# Patient Record
Sex: FEMALE | Race: WHITE | NOT HISPANIC OR LATINO | URBAN - METROPOLITAN AREA
[De-identification: names, ages, dates, MRNs, and addresses within clinical notes are randomized per-mention and may not be internally consistent; named-entity substitution may affect disease eponyms.]

---

## 2019-01-13 ENCOUNTER — EMERGENCY (EMERGENCY)
Facility: HOSPITAL | Age: 79
LOS: 0 days | Discharge: HOME | End: 2019-01-13
Attending: EMERGENCY MEDICINE | Admitting: EMERGENCY MEDICINE

## 2019-01-13 VITALS
SYSTOLIC BLOOD PRESSURE: 160 MMHG | OXYGEN SATURATION: 97 % | TEMPERATURE: 98 F | RESPIRATION RATE: 18 BRPM | DIASTOLIC BLOOD PRESSURE: 90 MMHG | HEART RATE: 92 BPM

## 2019-01-13 VITALS
RESPIRATION RATE: 19 BRPM | DIASTOLIC BLOOD PRESSURE: 83 MMHG | SYSTOLIC BLOOD PRESSURE: 195 MMHG | OXYGEN SATURATION: 98 % | HEART RATE: 91 BPM | TEMPERATURE: 98 F

## 2019-01-13 DIAGNOSIS — R05 COUGH: ICD-10-CM

## 2019-01-13 DIAGNOSIS — J40 BRONCHITIS, NOT SPECIFIED AS ACUTE OR CHRONIC: ICD-10-CM

## 2019-01-13 RX ORDER — DEXAMETHASONE 0.5 MG/5ML
10 ELIXIR ORAL ONCE
Qty: 0 | Refills: 0 | Status: COMPLETED | OUTPATIENT
Start: 2019-01-13 | End: 2019-01-13

## 2019-01-13 RX ORDER — AZITHROMYCIN 500 MG/1
1 TABLET, FILM COATED ORAL
Qty: 1 | Refills: 0
Start: 2019-01-13 | End: 2019-01-17

## 2019-01-13 RX ORDER — IPRATROPIUM/ALBUTEROL SULFATE 18-103MCG
3 AEROSOL WITH ADAPTER (GRAM) INHALATION ONCE
Qty: 0 | Refills: 0 | Status: COMPLETED | OUTPATIENT
Start: 2019-01-13 | End: 2019-01-13

## 2019-01-13 RX ORDER — ALBUTEROL 90 UG/1
2 AEROSOL, METERED ORAL
Qty: 1 | Refills: 0
Start: 2019-01-13 | End: 2019-02-11

## 2019-01-13 RX ORDER — ALBUTEROL 90 UG/1
2.5 AEROSOL, METERED ORAL ONCE
Qty: 0 | Refills: 0 | Status: COMPLETED | OUTPATIENT
Start: 2019-01-13 | End: 2019-01-13

## 2019-01-13 RX ADMIN — Medication 3 MILLILITER(S): at 13:57

## 2019-01-13 RX ADMIN — ALBUTEROL 2.5 MILLIGRAM(S): 90 AEROSOL, METERED ORAL at 15:20

## 2019-01-13 RX ADMIN — Medication 10 MILLIGRAM(S): at 15:23

## 2019-01-13 NOTE — ED ADULT NURSE NOTE - LANGUAGE ASSISTANCE NEEDED
No-Patient/Caregiver offered and refused free interpretation services./as per son patient is requesting for him to translate

## 2019-01-13 NOTE — ED PROVIDER NOTE - PHYSICAL EXAMINATION
Physical Exam    Vital Signs: I have reviewed the initial vital signs  Constitutional: well-nourished, appears stated age, uncomfortable  HEENT: Conjunctiva pink, Sclera clear, PERRLA, EOMI. Mucous membranes moist, no exudates or lesions noted, uvula midline.  Cardiovascular: S1 and S2 present, regular rate, regular rhythm. radial pulses equal and 2+ No peripheral edema  Respiratory: increased respiratory effort, diffuse wheezing and rhonchi  Integumentary: warm, dry, no rash  Neurologic: A & O x 3, CN II-XII grossly intact, all extremities’ motor and sensory functions grossly intact  Psychiatric: appropriate mood, appropriate affect

## 2019-01-13 NOTE — ED PROVIDER NOTE - PROGRESS NOTE DETAILS
PA fellow note reviewed and agree, Patient feels better with neb, CXR clear , no PNA. Dc,d home with MDI and ABX

## 2019-01-13 NOTE — ED PROVIDER NOTE - ATTENDING CONTRIBUTION TO CARE
Pt is a 79yo female who comes in for cough for 4 days.  No fever.  No production of sputum.  Son reports coughing fits.  Unsure if immunized.  Born in [place] - [place] to [place].  No other travel.  Everyone else at home similar symptoms.    Exam: diffuse wheezing, mild tachypnea, no retractions, no distress, no LE edema, no calf tenderness  Imp: bronchitis  Plan: XR chest, nebs

## 2019-01-13 NOTE — ED ADULT TRIAGE NOTE - LANGUAGE ASSISTANCE NEEDED
as per son patient is requesting for him to translate/No-Patient/Caregiver offered and refused free interpretation services.

## 2019-01-13 NOTE — ED PROVIDER NOTE - NS ED ROS FT
Review of Systems         Constitutional: (-) fever (-) chills (-) weakness       EENT: (-) visual changes (-) sore throat       Cardiovascular: (+) chest pain (-) syncope       Respiratory: (+) cough, (+) shortness of breath       Gastrointestinal: (-) abdominal pain (-) vomiting (-) diarrhea (-) nausea       Genitourinary: (-) dysuria        Musculoskeletal: (-) neck pain (-) back pain (-) joint pain       Integumentary: (-) rash       Neurological: (-) headache (-) altered mental status (-) dizziness       Psych: (-) psych history

## 2019-01-13 NOTE — ED ADULT TRIAGE NOTE - CHIEF COMPLAINT QUOTE
patient's son states, "I think her cough is so bad that she runs out of breath when she cough, it's been happening for the last 3 days"

## 2019-01-13 NOTE — ED PROVIDER NOTE - OBJECTIVE STATEMENT
78 year old female presenting with 1 week of productive cough. patient admits to chest pain, shortness of breath, and wheezing. Does admit to sick contacts at home with bronchitis. Patient admits to fatigue. Denies n/v/d. No fever or chills. Nonsmoker, no hx of asthma or COPD. No recent travel, patient's immunization status unknown when she was a child.

## 2020-12-01 PROBLEM — Z00.00 ENCOUNTER FOR PREVENTIVE HEALTH EXAMINATION: Status: ACTIVE | Noted: 2020-12-01

## 2021-03-04 ENCOUNTER — APPOINTMENT (OUTPATIENT)
Dept: GASTROENTEROLOGY | Facility: CLINIC | Age: 81
End: 2021-03-04

## 2021-04-23 ENCOUNTER — APPOINTMENT (OUTPATIENT)
Dept: OBGYN | Facility: CLINIC | Age: 81
End: 2021-04-23

## 2021-04-23 PROBLEM — Z00.00 ENCOUNTER FOR PREVENTIVE HEALTH EXAMINATION: Status: ACTIVE | Noted: 2021-04-23

## 2021-09-10 ENCOUNTER — INPATIENT (INPATIENT)
Facility: HOSPITAL | Age: 81
LOS: 2 days | Discharge: HOME | End: 2021-09-13
Attending: STUDENT IN AN ORGANIZED HEALTH CARE EDUCATION/TRAINING PROGRAM | Admitting: STUDENT IN AN ORGANIZED HEALTH CARE EDUCATION/TRAINING PROGRAM
Payer: MEDICARE

## 2021-09-10 VITALS
HEIGHT: 61 IN | TEMPERATURE: 98 F | HEART RATE: 83 BPM | SYSTOLIC BLOOD PRESSURE: 134 MMHG | OXYGEN SATURATION: 97 % | DIASTOLIC BLOOD PRESSURE: 60 MMHG | RESPIRATION RATE: 18 BRPM | WEIGHT: 259.93 LBS

## 2021-09-10 LAB
ALBUMIN SERPL ELPH-MCNC: 4.2 G/DL — SIGNIFICANT CHANGE UP (ref 3.5–5.2)
ALP SERPL-CCNC: 84 U/L — SIGNIFICANT CHANGE UP (ref 30–115)
ALT FLD-CCNC: 15 U/L — SIGNIFICANT CHANGE UP (ref 0–41)
ANION GAP SERPL CALC-SCNC: 14 MMOL/L — SIGNIFICANT CHANGE UP (ref 7–14)
AST SERPL-CCNC: 12 U/L — SIGNIFICANT CHANGE UP (ref 0–41)
BASE EXCESS BLDV CALC-SCNC: 0.9 MMOL/L — SIGNIFICANT CHANGE UP (ref -2–3)
BASOPHILS # BLD AUTO: 0.06 K/UL — SIGNIFICANT CHANGE UP (ref 0–0.2)
BASOPHILS NFR BLD AUTO: 0.6 % — SIGNIFICANT CHANGE UP (ref 0–1)
BILIRUB DIRECT SERPL-MCNC: <0.2 MG/DL — SIGNIFICANT CHANGE UP (ref 0–0.2)
BILIRUB INDIRECT FLD-MCNC: >0.1 MG/DL — LOW (ref 0.2–1.2)
BILIRUB SERPL-MCNC: 0.3 MG/DL — SIGNIFICANT CHANGE UP (ref 0.2–1.2)
BUN SERPL-MCNC: 22 MG/DL — HIGH (ref 10–20)
CA-I SERPL-SCNC: 1.21 MMOL/L — SIGNIFICANT CHANGE UP (ref 1.15–1.33)
CALCIUM SERPL-MCNC: 9.5 MG/DL — SIGNIFICANT CHANGE UP (ref 8.5–10.1)
CHLORIDE SERPL-SCNC: 101 MMOL/L — SIGNIFICANT CHANGE UP (ref 98–110)
CO2 SERPL-SCNC: 24 MMOL/L — SIGNIFICANT CHANGE UP (ref 17–32)
CREAT SERPL-MCNC: 1.2 MG/DL — SIGNIFICANT CHANGE UP (ref 0.7–1.5)
EOSINOPHIL # BLD AUTO: 0.28 K/UL — SIGNIFICANT CHANGE UP (ref 0–0.7)
EOSINOPHIL NFR BLD AUTO: 2.8 % — SIGNIFICANT CHANGE UP (ref 0–8)
GAS PNL BLDV: 134 MMOL/L — LOW (ref 136–145)
GAS PNL BLDV: SIGNIFICANT CHANGE UP
GLUCOSE BLDC GLUCOMTR-MCNC: 193 MG/DL — HIGH (ref 70–99)
GLUCOSE SERPL-MCNC: 322 MG/DL — HIGH (ref 70–99)
HCO3 BLDV-SCNC: 26 MMOL/L — SIGNIFICANT CHANGE UP (ref 22–29)
HCT VFR BLD CALC: 37.8 % — SIGNIFICANT CHANGE UP (ref 37–47)
HCT VFR BLDA CALC: 48 % — HIGH (ref 34.5–46.5)
HGB BLD CALC-MCNC: 16 G/DL — SIGNIFICANT CHANGE UP (ref 11.7–16.1)
HGB BLD-MCNC: 12.8 G/DL — SIGNIFICANT CHANGE UP (ref 12–16)
IMM GRANULOCYTES NFR BLD AUTO: 0.5 % — HIGH (ref 0.1–0.3)
LACTATE BLDV-MCNC: 2.6 MMOL/L — HIGH (ref 0.5–2)
LYMPHOCYTES # BLD AUTO: 2.86 K/UL — SIGNIFICANT CHANGE UP (ref 1.2–3.4)
LYMPHOCYTES # BLD AUTO: 29.1 % — SIGNIFICANT CHANGE UP (ref 20.5–51.1)
MAGNESIUM SERPL-MCNC: 1.5 MG/DL — LOW (ref 1.8–2.4)
MCHC RBC-ENTMCNC: 28.8 PG — SIGNIFICANT CHANGE UP (ref 27–31)
MCHC RBC-ENTMCNC: 33.9 G/DL — SIGNIFICANT CHANGE UP (ref 32–37)
MCV RBC AUTO: 84.9 FL — SIGNIFICANT CHANGE UP (ref 81–99)
MONOCYTES # BLD AUTO: 0.73 K/UL — HIGH (ref 0.1–0.6)
MONOCYTES NFR BLD AUTO: 7.4 % — SIGNIFICANT CHANGE UP (ref 1.7–9.3)
NEUTROPHILS # BLD AUTO: 5.86 K/UL — SIGNIFICANT CHANGE UP (ref 1.4–6.5)
NEUTROPHILS NFR BLD AUTO: 59.6 % — SIGNIFICANT CHANGE UP (ref 42.2–75.2)
NRBC # BLD: 0 /100 WBCS — SIGNIFICANT CHANGE UP (ref 0–0)
NT-PROBNP SERPL-SCNC: 663 PG/ML — HIGH (ref 0–300)
PCO2 BLDV: 42 MMHG — SIGNIFICANT CHANGE UP (ref 39–42)
PH BLDV: 7.4 — SIGNIFICANT CHANGE UP (ref 7.32–7.43)
PHOSPHATE SERPL-MCNC: 3.3 MG/DL — SIGNIFICANT CHANGE UP (ref 2.1–4.9)
PLATELET # BLD AUTO: 187 K/UL — SIGNIFICANT CHANGE UP (ref 130–400)
PO2 BLDV: 47 MMHG — SIGNIFICANT CHANGE UP
POTASSIUM BLDV-SCNC: 4 MMOL/L — SIGNIFICANT CHANGE UP (ref 3.5–5.1)
POTASSIUM SERPL-MCNC: 4.1 MMOL/L — SIGNIFICANT CHANGE UP (ref 3.5–5)
POTASSIUM SERPL-SCNC: 4.1 MMOL/L — SIGNIFICANT CHANGE UP (ref 3.5–5)
PROT SERPL-MCNC: 7 G/DL — SIGNIFICANT CHANGE UP (ref 6–8)
RAPID RVP RESULT: SIGNIFICANT CHANGE UP
RBC # BLD: 4.45 M/UL — SIGNIFICANT CHANGE UP (ref 4.2–5.4)
RBC # FLD: 13.5 % — SIGNIFICANT CHANGE UP (ref 11.5–14.5)
SAO2 % BLDV: 80.4 % — SIGNIFICANT CHANGE UP
SARS-COV-2 RNA SPEC QL NAA+PROBE: SIGNIFICANT CHANGE UP
SODIUM SERPL-SCNC: 139 MMOL/L — SIGNIFICANT CHANGE UP (ref 135–146)
TROPONIN T SERPL-MCNC: <0.01 NG/ML — SIGNIFICANT CHANGE UP
WBC # BLD: 9.84 K/UL — SIGNIFICANT CHANGE UP (ref 4.8–10.8)
WBC # FLD AUTO: 9.84 K/UL — SIGNIFICANT CHANGE UP (ref 4.8–10.8)

## 2021-09-10 PROCEDURE — 71045 X-RAY EXAM CHEST 1 VIEW: CPT | Mod: 26,76

## 2021-09-10 PROCEDURE — 99285 EMERGENCY DEPT VISIT HI MDM: CPT

## 2021-09-10 PROCEDURE — 99223 1ST HOSP IP/OBS HIGH 75: CPT

## 2021-09-10 PROCEDURE — 93010 ELECTROCARDIOGRAM REPORT: CPT | Mod: 76

## 2021-09-10 RX ORDER — ASPIRIN/CALCIUM CARB/MAGNESIUM 324 MG
81 TABLET ORAL DAILY
Refills: 0 | Status: DISCONTINUED | OUTPATIENT
Start: 2021-09-10 | End: 2021-09-13

## 2021-09-10 RX ORDER — LOSARTAN POTASSIUM 100 MG/1
50 TABLET, FILM COATED ORAL DAILY
Refills: 0 | Status: DISCONTINUED | OUTPATIENT
Start: 2021-09-10 | End: 2021-09-13

## 2021-09-10 RX ORDER — INSULIN GLARGINE 100 [IU]/ML
20 INJECTION, SOLUTION SUBCUTANEOUS AT BEDTIME
Refills: 0 | Status: DISCONTINUED | OUTPATIENT
Start: 2021-09-10 | End: 2021-09-11

## 2021-09-10 RX ORDER — FUROSEMIDE 40 MG
40 TABLET ORAL ONCE
Refills: 0 | Status: COMPLETED | OUTPATIENT
Start: 2021-09-10 | End: 2021-09-10

## 2021-09-10 RX ORDER — MAGNESIUM SULFATE 500 MG/ML
2 VIAL (ML) INJECTION ONCE
Refills: 0 | Status: COMPLETED | OUTPATIENT
Start: 2021-09-10 | End: 2021-09-10

## 2021-09-10 RX ORDER — INSULIN LISPRO 100/ML
VIAL (ML) SUBCUTANEOUS
Refills: 0 | Status: DISCONTINUED | OUTPATIENT
Start: 2021-09-10 | End: 2021-09-13

## 2021-09-10 RX ORDER — ENOXAPARIN SODIUM 100 MG/ML
40 INJECTION SUBCUTANEOUS DAILY
Refills: 0 | Status: DISCONTINUED | OUTPATIENT
Start: 2021-09-10 | End: 2021-09-13

## 2021-09-10 RX ORDER — INSULIN HUMAN 100 [IU]/ML
5 INJECTION, SOLUTION SUBCUTANEOUS ONCE
Refills: 0 | Status: COMPLETED | OUTPATIENT
Start: 2021-09-10 | End: 2021-09-10

## 2021-09-10 RX ORDER — FUROSEMIDE 40 MG
40 TABLET ORAL
Refills: 0 | Status: DISCONTINUED | OUTPATIENT
Start: 2021-09-10 | End: 2021-09-12

## 2021-09-10 RX ORDER — INSULIN LISPRO 100/ML
7 VIAL (ML) SUBCUTANEOUS
Refills: 0 | Status: DISCONTINUED | OUTPATIENT
Start: 2021-09-10 | End: 2021-09-11

## 2021-09-10 RX ORDER — INFLUENZA VIRUS VACCINE 15; 15; 15; 15 UG/.5ML; UG/.5ML; UG/.5ML; UG/.5ML
0.5 SUSPENSION INTRAMUSCULAR ONCE
Refills: 0 | Status: DISCONTINUED | OUTPATIENT
Start: 2021-09-10 | End: 2021-09-13

## 2021-09-10 RX ADMIN — Medication 12.5 GRAM(S): at 21:40

## 2021-09-10 RX ADMIN — Medication 50 GRAM(S): at 18:04

## 2021-09-10 RX ADMIN — INSULIN HUMAN 5 UNIT(S): 100 INJECTION, SOLUTION SUBCUTANEOUS at 18:05

## 2021-09-10 RX ADMIN — INSULIN GLARGINE 20 UNIT(S): 100 INJECTION, SOLUTION SUBCUTANEOUS at 23:27

## 2021-09-10 RX ADMIN — Medication 40 MILLIGRAM(S): at 18:05

## 2021-09-10 NOTE — ED PROVIDER NOTE - NS ED ROS FT
Review of Systems:  	•	CONSTITUTIONAL - no fever, no diaphoresis  	•	SKIN - no rash, no lesions  	•	HEMATOLOGIC - no bleeding, no bruising  	•	EYES - no discharge, no injection  	•	ENT - no sore throat, no runny nose  	•	RESPIRATORY - no shortness of breath, +cough  	•	CARDIAC - no chest pain, no palpitations, +swelling  	•	GI - no abd pain, no nausea, no vomiting, no diarrhea  	•	GENITO-URINARY - no dysuria, no hematuria  	•	MUSCULOSKELETAL - no joint pain, +back pain  	•	NEUROLOGIC - no dizziness, no headache

## 2021-09-10 NOTE — H&P ADULT - NSHPLABSRESULTS_GEN_ALL_CORE
VITAL SIGNS: Last 24 Hours  T(C): 36.9 (10 Sep 2021 15:24), Max: 36.9 (10 Sep 2021 15:24)  T(F): 98.5 (10 Sep 2021 15:24), Max: 98.5 (10 Sep 2021 15:24)  HR: 83 (10 Sep 2021 15:24) (83 - 83)  BP: 134/60 (10 Sep 2021 15:24) (134/60 - 134/60)  BP(mean): --  RR: 18 (10 Sep 2021 15:24) (18 - 18)  SpO2: 97% (10 Sep 2021 15:24) (97% - 97%)    LABS:                        12.8   9.84  )-----------( 187      ( 10 Sep 2021 16:55 )             37.8     09-10    139  |  101  |  22<H>  ----------------------------<  322<H>  4.1   |  24  |  1.2    Ca    9.5      10 Sep 2021 16:55  Phos  3.3     09-10  Mg     1.5     09-10    TPro  7.0  /  Alb  4.2  /  TBili  0.3  /  DBili  <0.2  /  AST  12  /  ALT  15  /  AlkPhos  84  09-10    Troponin T, Serum: <0.01 ng/mL (09-10-21 @ 16:55)    CARDIAC MARKERS ( 10 Sep 2021 16:55 )  x     / <0.01 ng/mL / x     / x     / x

## 2021-09-10 NOTE — H&P ADULT - HISTORY OF PRESENT ILLNESS
Patient is a 81 year old female with a PMHx of HTN, HLD, DMT2, Gout, CKD, and CHF? (unknown EF) was brought to the hospital due to left sided flank pain for two weeks. The patient notes that due to this pain. The pain is described as constant, non-radiating pain. Denies frequency, urgency, hematuria, nausea, vomiting, diarrhea, fever, or chills. Denies prior episodes of this pain. Due to this pain, the patient discontinue her "water pill" and has notice increased weight gain, leg swelling, increased cough, fatigue, shortness of breath with exertion.     In the ED, patient is was hemodynamically stable. Labs showing Mg of 1.5 and . CXR showing pulmonary edema. The patient will be admitted to medicine for further management.

## 2021-09-10 NOTE — ED PROVIDER NOTE - OBJECTIVE STATEMENT
81yF pmhx HTN, HLD, DM, gout, ?CHF, CKD renal function "30%" brought in by granddaughter due to complaints of LT kidney pain x2wks; constant, non-radiating; granddaughter states that pt has also been gaining a lot of weight recently, leg swelling, and coughing a lot at night which has made her tired during the day; reports being on a water pill, but pt states she has not been taking it as she believes it is causing her "kidney pain." Denies fever/chills, dysuria, chest pain, SOB, abd pain, n/v/d.

## 2021-09-10 NOTE — ED PROVIDER NOTE - PHYSICAL EXAMINATION
Vital Signs: Reviewed  GEN: alert, NAD, speaks full sentences  HEAD:  normocephalic, atraumatic  EYES:  PERRLA; conjunctivae without injection, drainage or discharge  ENMT:  nasal mucosa moist; mouth moist without ulcerations or lesions; throat moist without erythema, exudate, ulcerations or lesions  NECK:  supple  CARDIAC:  regular rate, normal S1 and S2, no murmurs  RESP:  respiratory rate and effort appear normal for age; crackles b/l  ABDOMEN:  soft, distended, non-tender  : no flank tenderness  MUSCULOSKELETAL/NEURO:  normal movement, normal tone  SKIN:  normal skin color for age and race, well-perfused; warm and dry

## 2021-09-10 NOTE — ED PROVIDER NOTE - CLINICAL SUMMARY MEDICAL DECISION MAKING FREE TEXT BOX
82 yo woman with worsening edema and left flank pain and fatigue.   IV furosemide and admission for CHF exacerbation.

## 2021-09-10 NOTE — ED PROVIDER NOTE - CARE PLAN
Principal Discharge DX:	CHF exacerbation  Secondary Diagnosis:	Hyperglycemia  Secondary Diagnosis:	Hypomagnesemia   1

## 2021-09-10 NOTE — ED ADULT TRIAGE NOTE - CHIEF COMPLAINT QUOTE
pt ambulated to triage with complaint of right side kidney pain , pt is very tired and sleeping all day

## 2021-09-10 NOTE — H&P ADULT - ASSESSMENT
Patient is a 81 year old female with a PMHx of HTN, HLD, DMT2, Gout, CKD, and CHF (unknown EF) was brought to the hospital due to left sided flank pain for two weeks. The patient notes that due to this pain. The pain is described as constant, non-radiating pain. Due to this pain, the patient discontinue her "water pill" and has notice increased weight gain, leg swelling, increased cough, fatigue granddaughter states that pt has also been gaining a lot of weight recently, leg swelling, and coughing.     In the ED, patient is       Acute CHF exacerbation  - admit to telemetry   - CXR showing pulmonary edema  - Follow up 2D-Echo  - strict I&O, daily weights, 1 L fluid restriction   - c/w IV diuresis     Left Flank Pain   - Follow up US renal/bladder  - Follow up urinalysis     Hypomagnesemia  - Mg 1.5, s/p 4 g IV  - follow up repeat BMP    HTN (hypertension)- well controlled     HLD (hyperlipidemia)  - follow up lipid panel     DM (diabetes mellitus)  - follow up A1c    Gout      #Misc  - DVT Prophylaxis: Lovenox  - GI Prophylaxis: n/a  - Diet: DASH/TLC/CC  - Activity: as tolerated   - IV Fluids: n/a  - Code Status: Full Code  - Dispo:  admit to medicine  Patient is a 81 year old Latvian speaking female with a PMHx of HTN, HLD, DMT2, Gout, CKD, and CHF? (unknown EF) was brought to the hospital due to left sided flank pain for two weeks.  Patients grandson at bedside serving as . The pain is described as constant, non-radiating pain. Denies frequency, urgency, hematuria, nausea, vomiting, diarrhea, fever, or chills. Denies prior episodes of this pain. Due to this pain, the patient discontinue her "water pill" and has notice increased weight gain, leg swelling, increased dry cough, fatigue, shortness of breath with exertion, and orthopnea.     In the ED, patient is was hemodynamically stable. Labs showing Mg of 1.5 and . CXR showing pulmonary edema. The patient will be admitted to medicine for further management.     *Patients medication list reviewed with granddaughter, over the phone. All the medications the patient is taking are from [place] and are not available in the [place] including multiple medication for diabetes, HTN, and cholesterol. Please ask family to bring all home medications in*    Acute CHF exacerbation, unknown EF  - CXR showing pulmonary edema  - follow up repeat CXR in am  - saturating well on room air  - Follow up EKG   - Follow up 2D-Echo, unknown EF  - strict I&O, daily weights, 1 L fluid restriction   - c/w IV diuresis: 40 mg IV lasix BID  - monitor electrolytes while on IV diruesis  - troponin negative, trend    Left Flank Pain - r/o renal calculus vs infection (unlikely)   - no prior episodes of flank pain- no fever, no WBC count, pending urinalysis  - denies hematuria, dysuria, frequency  - follow up US renal/bladder to assess for obstruction or hydronephrosis  - follow up urinalysis     Hypomagnesemia  - Mg 1.5, s/p 4 g IV  - follow up repeat BMP    HTN (hypertension)- well controlled   - c/w ACE-I    HLD (hyperlipidemia)  - follow up lipid panel   - home medication not available in [place], will start Lipitor 10 mg  for now     DM (diabetes mellitus)  - follow up A1c  - Insulin regimen: Lantus 20, Lispro 7/7/7 + SS  - maintain FSG <180, FSG qACHS    Gout  - no evidence of acute flare     #Misc  - DVT Prophylaxis: Lovenox  - GI Prophylaxis: n/a  - Diet: DASH/TLC/CC  - Activity: as tolerated   - IV Fluids: n/a  - Code Status: Full Code  - Dispo:  admit to medicine  Patient is a 81 year old Indonesian speaking female with a PMHx of HTN, HLD, DMT2, Gout, CKD, and CHF? (unknown EF) was brought to the hospital due to left sided flank pain for two weeks and weight gain, leg swelling, a dry cough, fatigue, shortness of breath with exertion, and orthopnea.     *Patients medication list reviewed with granddaughter, over the phone. All the medications the patient is taking are from [place] and are not available in the [place] including multiple medication for diabetes, HTN, and cholesterol. Please ask family to bring all home medications in*    Acute CHF exacerbation, unknown EF  - CXR showing pulmonary edema  - follow up repeat CXR in am  - saturating well on room air  - Follow up EKG   - Follow up 2D-Echo, unknown EF  - strict I&O, daily weights, 1 L fluid restriction   - c/w IV diuresis: 40 mg IV lasix BID  - monitor electrolytes while on IV diruesis  - troponin negative, trend    Left Flank Pain - r/o renal calculus vs infection (unlikely)   - no prior episodes of flank pain- no fever, no WBC count, pending urinalysis  - denies hematuria, dysuria, frequency  - follow up US renal/bladder to assess for obstruction or hydronephrosis  - follow up urinalysis     Hypomagnesemia  - Mg 1.5, s/p 4 g IV  - follow up repeat BMP    HTN (hypertension)- well controlled   - c/w ACE-I    HLD (hyperlipidemia)  - follow up lipid panel   - home medication not available in [place], will start Lipitor 10 mg  for now     DM (diabetes mellitus)  - follow up A1c  - Insulin regimen: Lantus 20, Lispro 7/7/7 + SS  - maintain FSG <180, FSG qACHS    Gout  - no evidence of acute flare     #Misc  - DVT Prophylaxis: Lovenox  - GI Prophylaxis: n/a  - Diet: DASH/TLC/CC  - Activity: as tolerated   - IV Fluids: n/a  - Code Status: Full Code  - Dispo:  admit to medicine

## 2021-09-10 NOTE — H&P ADULT - NSHPPHYSICALEXAM_GEN_ALL_CORE
CONSTITUTIONAL: No acute distress, well-developed, well-groomed, AAOx3  HEAD: Atraumatic, normocephalic  EYES: EOM intact, PERRLA, conjunctiva and sclera clear  ENT: Supple, no masses, no thyromegaly, no bruits, no JVD; moist mucous membranes  PULMONARY: Clear to auscultation bilaterally; no wheezes, rales, or rhonchi  CARDIOVASCULAR: Regular rate and rhythm; no murmurs, rubs, or gallops  GASTROINTESTINAL: Soft, non-tender, non-distended; bowel sounds present  MUSCULOSKELETAL: 2+ peripheral pulses; no clubbing, no cyanosis, no edema  NEUROLOGY: non-focal  SKIN: No rashes or lesions; warm and dry CONSTITUTIONAL: No acute distress, well-developed, well-groomed, AAOx3  HEAD: Atraumatic, normocephalic  EYES: EOM intact, PERRLA, conjunctiva and sclera clear  PULMONARY: bibasilar crackles  CARDIOVASCULAR: Regular rate and rhythm; no murmurs  GASTROINTESTINAL: Soft, non-tender, non-distended; bowel sounds present  MUSCULOSKELETAL: 2+ peripheral pulses; no clubbing, no cyanosis,  2+ pitting edema  NEUROLOGY: non-focal  SKIN: No rashes or lesions; warm and dry

## 2021-09-10 NOTE — H&P ADULT - NSHPREVIEWOFSYSTEMS_GEN_ALL_CORE
CONSTITUTIONAL: No weakness, fevers or chills; No headaches  EYES: No visual changes, eye pain, or discharge  ENT: No vertigo; No ear pain or change in hearing; No sore throat or difficulty swallowing  NECK: No pain or stiffness  RESPIRATORY: No cough, wheezing, or hemoptysis; No shortness of breath  CARDIOVASCULAR: No chest pain or palpitations  GASTROINTESTINAL: No abdominal or epigastric pain; No nausea, vomiting, or hematemesis; No diarrhea or constipation; No melena or hematochezia  GENITOURINARY: No dysuria, frequency or hematuria  MUSCULOSKELETAL: No joint pain, no muscle pain, no weakness  NEUROLOGICAL: No numbness or weakness  SKIN: No itching or rashes CONSTITUTIONAL: + weakness, no fevers or chills; No headaches  EYES: No visual changes, eye pain, or discharge  ENT: No vertigo; No ear pain or change in hearing; No sore throat or difficulty swallowing  NECK: No pain or stiffness  RESPIRATORY: + dry cough, no sputum production   CARDIOVASCULAR: No chest pain or palpitations  GASTROINTESTINAL: No abdominal or epigastric pain; No nausea, vomiting, or hematemesis; No diarrhea or constipation; No melena or hematochezia  GENITOURINARY: No dysuria, frequency or hematuria, + flank pain   MUSCULOSKELETAL: No joint pain, no muscle pain, no weakness, + LE edema   NEUROLOGICAL: No numbness or weakness  SKIN: No itching or rashes

## 2021-09-10 NOTE — ED PROVIDER NOTE - ATTENDING CONTRIBUTION TO CARE
I personally evaluated the patient. I reviewed the Resident’s or Physician Assistant’s note (as assigned above), and agree with the findings and plan except as documented in my note.  80 yo woman with edema and increased fatigue and left flank pain.  Appears that she has not been taking her "water pill" as prescribed.  Workup revealed CHF exacerbation.  IV furosemide and admission.

## 2021-09-10 NOTE — ED PROVIDER NOTE - NS_EDPROVIDERDISPOUSERTYPE_ED_A_ED
After Visit Summary   2/5/2018    Oneyda Chisholm    MRN: [id number]           Patient Information     Date Of Birth          1998        Visit Information        Provider Department      2/5/2018 3:20 PM Yudelka Moore MD [facility]        Today's Diagnoses     Injury of left knee, initial encounter    -  1      Care Instructions      Plan:  - Today's Plan of Care:  Rehab: Physical Therapy: [facility] - [phone]    -We also discussed other future treatment options:  MRI    Follow Up: as needed            Follow-ups after your visit        Additional Services     [facility] PT, HAND, AND CHIROPRACTIC REFERRAL       **This order will print in the [facility] Scheduling Office**    Physical Therapy, Hand Therapy and Chiropractic Care are available through:    *[facility]  *[facility]  *[facility]    Call one number to schedule at any of the above locations: [phone].    Your provider has referred you to: Physical Therapy at [facility] or [facility]    Indication/Reason for Referral: Knee pain  Onset of Illness: 2 weeks  Therapy Orders: Evaluate and Treat  Special Programs: None  Special Request: None    Mely Boone      Additional Comments for the Therapist or Chiropractor: none    Please be aware that coverage of these services is subject to the terms and limitations of your health insurance plan.  Call member services at your health plan with any benefit or coverage questions.      Please bring the following to your appointment:    *Your personal calendar for scheduling future appointments  *Comfortable clothing                  Follow-up notes from your care team     Return if symptoms worsen or fail to improve.      Who to contact     If you have questions or need follow up information about today's clinic visit or your schedule please contact [facility] directly at  "[phone].  Normal or non-critical lab and imaging results will be communicated to you by MyChart, letter or phone within 4 business days after the clinic has received the results. If you do not hear from us within 7 days, please contact the clinic through [facility] [facility] or phone. If you have a critical or abnormal lab result, we will notify you by phone as soon as possible.  Submit refill requests through [employer] or call your pharmacy and they will forward the refill request to us. Please allow 3 business days for your refill to be completed.          Additional Information About Your Visit        [facility] Information     [employer] lets you send messages to your doctor, view your test results, renew your prescriptions, schedule appointments and more. To sign up, go to [email or web address] . Click on \"Log in\" on the left side of the screen, which will take you to the Welcome page. Then click on \"Sign up Now\" on the right side of the page.     You will be asked to enter the access code listed below, as well as some personal information. Please follow the directions to create your username and password.     Your access code is: [email or web address]  Expires: 2018 11:02 AM     Your access code will  in 90 days. If you need help or a new code, please call your [place] clinic or [phone].        Care EveryWhere ID     This is your Care EveryWhere ID. This could be used by other organizations to access your [place] medical records  [id number]        Your Vitals Were     Height BMI (Body Mass Index)                5' 3\" (1.6 m) 33.3 kg/m2           Blood Pressure from Last 3 Encounters:   18 118/54   18 120/74    Weight from Last 3 Encounters:   18 188 lb (85.3 kg) (96 %)*   18 187 lb 4.8 oz (85 kg) (96 %)*     * Growth percentiles are based on CDC 2-20 Years data.              We Performed the Following     [facility] PT, HAND, AND CHIROPRACTIC REFERRAL        Primary Care Provider Office Phone # " Fax #    Nito Kay [phone]       [facility] [address] [address]        Equal Access to Services     JOSESITO GAGE : Hadii aad ku hadorlandomarcin Solatha, waaxda luqadaha, qaybta kaalmada jenny, soco vázquez navjotradha odom dk jurado. So [facility] [phone].    ATENCIÓN: Si habla español, tiene a mack disposición servicios gratuitos de asistencia lingüística. Llame al [phone].    We comply with applicable federal civil rights laws and [place] laws. We do not discriminate on the basis of race, color, national origin, age, disability, sex, sexual orientation, or gender identity.            Thank you!     Thank you for choosing [facility]  for your care. Our goal is always to provide you with excellent care. Hearing back from our patients is one way we can continue to improve our services. Please take a few minutes to complete the written survey that you may receive in the mail after your visit with us. Thank you!             Your Updated Medication List - Protect others around you: Learn how to safely use, store and throw away your medicines at www.disposemymeds.org.          This list is accurate as of 2/5/18  3:48 PM.  Always use your most recent med list.                   Brand Name Dispense Instructions for use Diagnosis    IBUPROFEN PO      Take by mouth as needed for moderate pain        order for DME     1 Device    Equipment being ordered: knee suppt xl patella horseshoe    Injury of left knee, initial encounter          Attending Attestation (For Attendings USE Only)...

## 2021-09-11 DIAGNOSIS — Z96.653 PRESENCE OF ARTIFICIAL KNEE JOINT, BILATERAL: Chronic | ICD-10-CM

## 2021-09-11 LAB
A1C WITH ESTIMATED AVERAGE GLUCOSE RESULT: 9.1 % — HIGH (ref 4–5.6)
ALBUMIN SERPL ELPH-MCNC: 3.9 G/DL — SIGNIFICANT CHANGE UP (ref 3.5–5.2)
ALP SERPL-CCNC: 77 U/L — SIGNIFICANT CHANGE UP (ref 30–115)
ALT FLD-CCNC: 13 U/L — SIGNIFICANT CHANGE UP (ref 0–41)
ANION GAP SERPL CALC-SCNC: 14 MMOL/L — SIGNIFICANT CHANGE UP (ref 7–14)
AST SERPL-CCNC: 21 U/L — SIGNIFICANT CHANGE UP (ref 0–41)
BASOPHILS # BLD AUTO: 0.03 K/UL — SIGNIFICANT CHANGE UP (ref 0–0.2)
BASOPHILS NFR BLD AUTO: 0.3 % — SIGNIFICANT CHANGE UP (ref 0–1)
BILIRUB SERPL-MCNC: 0.5 MG/DL — SIGNIFICANT CHANGE UP (ref 0.2–1.2)
BUN SERPL-MCNC: 21 MG/DL — HIGH (ref 10–20)
CALCIUM SERPL-MCNC: 9.3 MG/DL — SIGNIFICANT CHANGE UP (ref 8.5–10.1)
CHLORIDE SERPL-SCNC: 101 MMOL/L — SIGNIFICANT CHANGE UP (ref 98–110)
CHOLEST SERPL-MCNC: 146 MG/DL — SIGNIFICANT CHANGE UP
CO2 SERPL-SCNC: 25 MMOL/L — SIGNIFICANT CHANGE UP (ref 17–32)
COVID-19 SPIKE DOMAIN AB INTERP: POSITIVE
COVID-19 SPIKE DOMAIN ANTIBODY RESULT: >250 U/ML — HIGH
CREAT SERPL-MCNC: 1.2 MG/DL — SIGNIFICANT CHANGE UP (ref 0.7–1.5)
EOSINOPHIL # BLD AUTO: 0.3 K/UL — SIGNIFICANT CHANGE UP (ref 0–0.7)
EOSINOPHIL NFR BLD AUTO: 3.5 % — SIGNIFICANT CHANGE UP (ref 0–8)
ESTIMATED AVERAGE GLUCOSE: 214 MG/DL — HIGH (ref 68–114)
GLUCOSE BLDC GLUCOMTR-MCNC: 287 MG/DL — HIGH (ref 70–99)
GLUCOSE BLDC GLUCOMTR-MCNC: 291 MG/DL — HIGH (ref 70–99)
GLUCOSE BLDC GLUCOMTR-MCNC: 335 MG/DL — HIGH (ref 70–99)
GLUCOSE BLDC GLUCOMTR-MCNC: 396 MG/DL — HIGH (ref 70–99)
GLUCOSE SERPL-MCNC: 246 MG/DL — HIGH (ref 70–99)
HCT VFR BLD CALC: 37.3 % — SIGNIFICANT CHANGE UP (ref 37–47)
HDLC SERPL-MCNC: 42 MG/DL — LOW
HGB BLD-MCNC: 12.6 G/DL — SIGNIFICANT CHANGE UP (ref 12–16)
IMM GRANULOCYTES NFR BLD AUTO: 0.5 % — HIGH (ref 0.1–0.3)
LACTATE SERPL-SCNC: 2.4 MMOL/L — HIGH (ref 0.7–2)
LIPID PNL WITH DIRECT LDL SERPL: 76 MG/DL — SIGNIFICANT CHANGE UP
LYMPHOCYTES # BLD AUTO: 2.34 K/UL — SIGNIFICANT CHANGE UP (ref 1.2–3.4)
LYMPHOCYTES # BLD AUTO: 27.2 % — SIGNIFICANT CHANGE UP (ref 20.5–51.1)
MAGNESIUM SERPL-MCNC: 1.9 MG/DL — SIGNIFICANT CHANGE UP (ref 1.8–2.4)
MCHC RBC-ENTMCNC: 29 PG — SIGNIFICANT CHANGE UP (ref 27–31)
MCHC RBC-ENTMCNC: 33.8 G/DL — SIGNIFICANT CHANGE UP (ref 32–37)
MCV RBC AUTO: 85.9 FL — SIGNIFICANT CHANGE UP (ref 81–99)
MONOCYTES # BLD AUTO: 0.74 K/UL — HIGH (ref 0.1–0.6)
MONOCYTES NFR BLD AUTO: 8.6 % — SIGNIFICANT CHANGE UP (ref 1.7–9.3)
NEUTROPHILS # BLD AUTO: 5.14 K/UL — SIGNIFICANT CHANGE UP (ref 1.4–6.5)
NEUTROPHILS NFR BLD AUTO: 59.9 % — SIGNIFICANT CHANGE UP (ref 42.2–75.2)
NON HDL CHOLESTEROL: 104 MG/DL — SIGNIFICANT CHANGE UP
NRBC # BLD: 0 /100 WBCS — SIGNIFICANT CHANGE UP (ref 0–0)
PLATELET # BLD AUTO: 189 K/UL — SIGNIFICANT CHANGE UP (ref 130–400)
POTASSIUM SERPL-MCNC: 4.4 MMOL/L — SIGNIFICANT CHANGE UP (ref 3.5–5)
POTASSIUM SERPL-SCNC: 4.4 MMOL/L — SIGNIFICANT CHANGE UP (ref 3.5–5)
PROT SERPL-MCNC: 6.7 G/DL — SIGNIFICANT CHANGE UP (ref 6–8)
RBC # BLD: 4.34 M/UL — SIGNIFICANT CHANGE UP (ref 4.2–5.4)
RBC # FLD: 13.7 % — SIGNIFICANT CHANGE UP (ref 11.5–14.5)
SARS-COV-2 IGG+IGM SERPL QL IA: >250 U/ML — HIGH
SARS-COV-2 IGG+IGM SERPL QL IA: POSITIVE
SODIUM SERPL-SCNC: 140 MMOL/L — SIGNIFICANT CHANGE UP (ref 135–146)
TRIGL SERPL-MCNC: 204 MG/DL — HIGH
TROPONIN T SERPL-MCNC: <0.01 NG/ML — SIGNIFICANT CHANGE UP
TSH SERPL-MCNC: 1.1 UIU/ML — SIGNIFICANT CHANGE UP (ref 0.27–4.2)
WBC # BLD: 8.59 K/UL — SIGNIFICANT CHANGE UP (ref 4.8–10.8)
WBC # FLD AUTO: 8.59 K/UL — SIGNIFICANT CHANGE UP (ref 4.8–10.8)

## 2021-09-11 PROCEDURE — 93010 ELECTROCARDIOGRAM REPORT: CPT

## 2021-09-11 PROCEDURE — 76775 US EXAM ABDO BACK WALL LIM: CPT | Mod: 26

## 2021-09-11 PROCEDURE — 71045 X-RAY EXAM CHEST 1 VIEW: CPT | Mod: 26

## 2021-09-11 PROCEDURE — 99233 SBSQ HOSP IP/OBS HIGH 50: CPT

## 2021-09-11 RX ORDER — INSULIN LISPRO 100/ML
2 VIAL (ML) SUBCUTANEOUS ONCE
Refills: 0 | Status: COMPLETED | OUTPATIENT
Start: 2021-09-11 | End: 2021-09-11

## 2021-09-11 RX ORDER — INSULIN GLARGINE 100 [IU]/ML
28 INJECTION, SOLUTION SUBCUTANEOUS AT BEDTIME
Refills: 0 | Status: DISCONTINUED | OUTPATIENT
Start: 2021-09-11 | End: 2021-09-12

## 2021-09-11 RX ORDER — INSULIN LISPRO 100/ML
9 VIAL (ML) SUBCUTANEOUS
Refills: 0 | Status: DISCONTINUED | OUTPATIENT
Start: 2021-09-11 | End: 2021-09-13

## 2021-09-11 RX ADMIN — Medication 81 MILLIGRAM(S): at 11:45

## 2021-09-11 RX ADMIN — ENOXAPARIN SODIUM 40 MILLIGRAM(S): 100 INJECTION SUBCUTANEOUS at 11:45

## 2021-09-11 RX ADMIN — Medication 9 UNIT(S): at 17:05

## 2021-09-11 RX ADMIN — Medication 40 MILLIGRAM(S): at 07:01

## 2021-09-11 RX ADMIN — Medication 3: at 08:15

## 2021-09-11 RX ADMIN — Medication 600 MILLIGRAM(S): at 17:06

## 2021-09-11 RX ADMIN — Medication 40 MILLIGRAM(S): at 17:06

## 2021-09-11 RX ADMIN — LOSARTAN POTASSIUM 50 MILLIGRAM(S): 100 TABLET, FILM COATED ORAL at 06:47

## 2021-09-11 RX ADMIN — Medication 2 UNIT(S): at 23:17

## 2021-09-11 RX ADMIN — INSULIN GLARGINE 28 UNIT(S): 100 INJECTION, SOLUTION SUBCUTANEOUS at 21:55

## 2021-09-11 RX ADMIN — Medication 4: at 17:04

## 2021-09-11 RX ADMIN — Medication 3: at 11:45

## 2021-09-11 RX ADMIN — Medication 9 UNIT(S): at 11:45

## 2021-09-11 NOTE — PROGRESS NOTE ADULT - ASSESSMENT
Patient is a 81 year old Kiswahili speaking female with a PMHx of HTN, HLD, DM2, Gout, CKD, and CHF? (unknown EF) p/w left sided flank pain for two weeks and weight gain/leg swelling/shortness of breath. Admitted for CHF exacerbation.     #Acute exacerbation of HF, unknown EF  - pt noncompliant with home diuretics due to flank pain   - CXR w/ diffuse b/l pulmonary opacities ,  ( no prior )   - saturating 97% on RA   - EKG unremarkable, trop negative   Plan:  - Follow up TTE, unknown EF  - strict I&O, daily weights, 1 L fluid restriction   - c/w IV diuresis: 40 mg IV lasix BID    #Left Flank Pain   - r/o stone vs hydronephrosis vs musculoskeletal etiology   - follow up US renal/bladder to assess for obstruction/hydronephrosis  -Creat 1.2, unknown baseline  - follow up urinalysis     #Hypomagnesemia- repleted    #HTN (hypertension)- well controlled, c/w losartan     #HLD (hyperlipidemia)- follow up lipid panel     #DM (diabetes mellitus), A1C 9.1, basal bolus insulin regimen, SSI     #Gout- no evidence of acute flare       - DVT Prophylaxis: Lovenox      #Progress Note Handoff:  Pending (specify):  improvement in flank pain, and improvement in heart failure   Disposition: Home___/SNF___/Other________/Unknown at this time____x____    Cindy Llamas,

## 2021-09-11 NOTE — PROGRESS NOTE ADULT - ASSESSMENT
Patient is a 81 year old Albanian speaking female with a PMHx of HTN, HLD, DMT2, Gout, CKD, and CHF? (unknown EF) was brought to the hospital due to left sided flank pain for two weeks and weight gain, leg swelling, a dry cough, fatigue, shortness of breath with exertion, and orthopnea.     Acute CHF exacerbation, unknown EF  - CXR showing pulmonary edema  - follow up repeat CXR in am  - saturating well on room air  - Follow up EKG   - Follow up 2D-Echo, unknown EF  - strict I&O, daily weights, 1 L fluid restriction   - c/w IV diuresis: 40 mg IV lasix BID  - monitor electrolytes while on IV diruesis  - troponin negative, trend    Left Flank Pain - r/o renal calculus vs infection vs hydronephrosis vs muscle spasm?  - no prior episodes of flank pain- no fever, no WBC count, less likely pyelonephritis  - follow up US renal/bladder to assess for obstruction or hydronephrosis  -Creat 1.2 unknown baseline  - follow up urinalysis     Hypomagnesemia  - Mg 1.5, s/p 4 g IV  -Mg stable 1.9    HTN (hypertension)- well controlled   - c/w ACE-I    HLD (hyperlipidemia)  - follow up lipid panel   - home medication not available in [place], will start Lipitor 10 mg  for now     DM (diabetes mellitus)  - follow up A1c  - Insulin regimen: Lantus 20, Lispro 7/7/7 + SS  - maintain FSG <180, FSG qACHS    Gout  - no evidence of acute flare     #Misc  - DVT Prophylaxis: Lovenox  - GI Prophylaxis: n/a  - Diet: DASH/TLC/CC  - Activity: as tolerated   - IV Fluids: n/a  - Code Status: Full Code  - Dispo:  admit to medicine    Patient is a 81 year old Persian speaking female with a PMHx of HTN, HLD, DMT2, Gout, CKD, and CHF? (unknown EF) was brought to the hospital due to left sided flank pain for two weeks and weight gain, leg swelling, a dry cough, fatigue, shortness of breath with exertion, and orthopnea.     Acute CHF exacerbation, unknown EF  - CXR showing pulmonary edema  - follow up repeat CXR in am  - saturating well on room air  - Follow up EKG   - Follow up 2D-Echo, unknown EF  - strict I&O, daily weights, 1 L fluid restriction   - c/w IV diuresis: 40 mg IV lasix BID  - monitor electrolytes while on IV diruesis  - troponin negative, trend    Left Flank Pain - r/o renal calculus vs infection vs hydronephrosis vs muscle spasm?  - no prior episodes of flank pain- no fever, no WBC count, less likely pyelonephritis  - follow up US renal/bladder to assess for obstruction or hydronephrosis  -Creat 1.2 unknown baseline  - follow up urinalysis     Hypomagnesemia  - Mg 1.5, s/p 4 g IV  -Mg stable 1.9    HTN (hypertension)- well controlled   - c/w ACE-I    HLD (hyperlipidemia)  - follow up lipid panel   - home medication not available in [place], will start Lipitor 10 mg  for now     DM (diabetes mellitus)  - follow up A1c  - Insulin regimen: Lantus 20, Lispro 7/7/7 + SS  - maintain FSG <180, FSG qACHS    Gout  - no evidence of acute flare     #Misc  - DVT Prophylaxis: Lovenox  - GI Prophylaxis: n/a  - Diet: DASH/TLC/CC  - Activity: as tolerated   - IV Fluids: n/a  -Pending: verify home medications with family. attempted to call daughter but wasn't able to reach her.  - Code Status: Full Code  - Dispo:  admit to medicine

## 2021-09-12 LAB
ANION GAP SERPL CALC-SCNC: 14 MMOL/L — SIGNIFICANT CHANGE UP (ref 7–14)
BUN SERPL-MCNC: 27 MG/DL — HIGH (ref 10–20)
CALCIUM SERPL-MCNC: 9.1 MG/DL — SIGNIFICANT CHANGE UP (ref 8.5–10.1)
CHLORIDE SERPL-SCNC: 96 MMOL/L — LOW (ref 98–110)
CO2 SERPL-SCNC: 24 MMOL/L — SIGNIFICANT CHANGE UP (ref 17–32)
CREAT SERPL-MCNC: 1.4 MG/DL — SIGNIFICANT CHANGE UP (ref 0.7–1.5)
GLUCOSE BLDC GLUCOMTR-MCNC: 271 MG/DL — HIGH (ref 70–99)
GLUCOSE BLDC GLUCOMTR-MCNC: 314 MG/DL — HIGH (ref 70–99)
GLUCOSE BLDC GLUCOMTR-MCNC: 349 MG/DL — HIGH (ref 70–99)
GLUCOSE BLDC GLUCOMTR-MCNC: 383 MG/DL — HIGH (ref 70–99)
GLUCOSE SERPL-MCNC: 267 MG/DL — HIGH (ref 70–99)
HCT VFR BLD CALC: 37.9 % — SIGNIFICANT CHANGE UP (ref 37–47)
HGB BLD-MCNC: 12.6 G/DL — SIGNIFICANT CHANGE UP (ref 12–16)
MCHC RBC-ENTMCNC: 29.1 PG — SIGNIFICANT CHANGE UP (ref 27–31)
MCHC RBC-ENTMCNC: 33.2 G/DL — SIGNIFICANT CHANGE UP (ref 32–37)
MCV RBC AUTO: 87.5 FL — SIGNIFICANT CHANGE UP (ref 81–99)
NRBC # BLD: 0 /100 WBCS — SIGNIFICANT CHANGE UP (ref 0–0)
PLATELET # BLD AUTO: 183 K/UL — SIGNIFICANT CHANGE UP (ref 130–400)
POTASSIUM SERPL-MCNC: 3.8 MMOL/L — SIGNIFICANT CHANGE UP (ref 3.5–5)
POTASSIUM SERPL-SCNC: 3.8 MMOL/L — SIGNIFICANT CHANGE UP (ref 3.5–5)
RBC # BLD: 4.33 M/UL — SIGNIFICANT CHANGE UP (ref 4.2–5.4)
RBC # FLD: 13.4 % — SIGNIFICANT CHANGE UP (ref 11.5–14.5)
SODIUM SERPL-SCNC: 134 MMOL/L — LOW (ref 135–146)
TROPONIN T SERPL-MCNC: <0.01 NG/ML — SIGNIFICANT CHANGE UP
WBC # BLD: 9.39 K/UL — SIGNIFICANT CHANGE UP (ref 4.8–10.8)
WBC # FLD AUTO: 9.39 K/UL — SIGNIFICANT CHANGE UP (ref 4.8–10.8)

## 2021-09-12 PROCEDURE — 99233 SBSQ HOSP IP/OBS HIGH 50: CPT

## 2021-09-12 PROCEDURE — 93306 TTE W/DOPPLER COMPLETE: CPT | Mod: 26

## 2021-09-12 RX ORDER — GUAIFENESIN/DEXTROMETHORPHAN 600MG-30MG
10 TABLET, EXTENDED RELEASE 12 HR ORAL ONCE
Refills: 0 | Status: COMPLETED | OUTPATIENT
Start: 2021-09-12 | End: 2021-09-13

## 2021-09-12 RX ORDER — BENZOCAINE AND MENTHOL 5; 1 G/100ML; G/100ML
1 LIQUID ORAL
Refills: 0 | Status: DISCONTINUED | OUTPATIENT
Start: 2021-09-12 | End: 2021-09-12

## 2021-09-12 RX ORDER — GUAIFENESIN/DEXTROMETHORPHAN 600MG-30MG
20 TABLET, EXTENDED RELEASE 12 HR ORAL ONCE
Refills: 0 | Status: DISCONTINUED | OUTPATIENT
Start: 2021-09-12 | End: 2021-09-12

## 2021-09-12 RX ORDER — FUROSEMIDE 40 MG
40 TABLET ORAL DAILY
Refills: 0 | Status: DISCONTINUED | OUTPATIENT
Start: 2021-09-13 | End: 2021-09-13

## 2021-09-12 RX ORDER — INSULIN GLARGINE 100 [IU]/ML
33 INJECTION, SOLUTION SUBCUTANEOUS AT BEDTIME
Refills: 0 | Status: DISCONTINUED | OUTPATIENT
Start: 2021-09-12 | End: 2021-09-13

## 2021-09-12 RX ORDER — BENZOCAINE 10 %
1 GEL (GRAM) MUCOUS MEMBRANE THREE TIMES A DAY
Refills: 0 | Status: DISCONTINUED | OUTPATIENT
Start: 2021-09-12 | End: 2021-09-13

## 2021-09-12 RX ADMIN — Medication 600 MILLIGRAM(S): at 05:41

## 2021-09-12 RX ADMIN — Medication 600 MILLIGRAM(S): at 17:19

## 2021-09-12 RX ADMIN — Medication 4: at 11:42

## 2021-09-12 RX ADMIN — LOSARTAN POTASSIUM 50 MILLIGRAM(S): 100 TABLET, FILM COATED ORAL at 05:41

## 2021-09-12 RX ADMIN — Medication 1 SPRAY(S): at 23:32

## 2021-09-12 RX ADMIN — Medication 5: at 17:18

## 2021-09-12 RX ADMIN — Medication 3: at 07:51

## 2021-09-12 RX ADMIN — INSULIN GLARGINE 33 UNIT(S): 100 INJECTION, SOLUTION SUBCUTANEOUS at 22:47

## 2021-09-12 RX ADMIN — Medication 9 UNIT(S): at 17:19

## 2021-09-12 RX ADMIN — Medication 40 MILLIGRAM(S): at 05:41

## 2021-09-12 RX ADMIN — ENOXAPARIN SODIUM 40 MILLIGRAM(S): 100 INJECTION SUBCUTANEOUS at 11:42

## 2021-09-12 RX ADMIN — Medication 9 UNIT(S): at 07:51

## 2021-09-12 RX ADMIN — Medication 9 UNIT(S): at 11:42

## 2021-09-12 RX ADMIN — Medication 81 MILLIGRAM(S): at 11:42

## 2021-09-12 NOTE — PROGRESS NOTE ADULT - SUBJECTIVE AND OBJECTIVE BOX
DEBBIE RHODES  81y, Female  Allergy: No Known Allergies    Hospital Day: 1d    Patient seen and examined earlier today. Endorses improvement in abdominal pain. FRANCY.     PMH/PSH:  PAST MEDICAL & SURGICAL HISTORY:  HTN (hypertension)    HLD (hyperlipidemia)    DM (diabetes mellitus)    Gout    History of bilateral knee replacement        LAST 24-Hr EVENTS:    VITALS:  T(F): 97.9 (09-11-21 @ 04:55), Max: 98.5 (09-10-21 @ 15:24)  HR: 85 (09-11-21 @ 04:55)  BP: 141/74 (09-11-21 @ 04:55) (126/71 - 141/74)  RR: 19 (09-11-21 @ 04:55)  SpO2: 97% (09-10-21 @ 20:58)        TESTS & MEASUREMENTS:  Weight (Kg): 113.3 (09-10-21 @ 22:15)  BMI (kg/m2): 41.6 (09-10)                          12.6   8.59  )-----------( 189      ( 11 Sep 2021 04:30 )             37.3       09-11    140  |  101  |  21<H>  ----------------------------<  246<H>  4.4   |  25  |  1.2    Ca    9.3      11 Sep 2021 04:30  Phos  3.3     09-10  Mg     1.9     09-11    TPro  6.7  /  Alb  3.9  /  TBili  0.5  /  DBili  x   /  AST  21  /  ALT  13  /  AlkPhos  77  09-11    LIVER FUNCTIONS - ( 11 Sep 2021 04:30 )  Alb: 3.9 g/dL / Pro: 6.7 g/dL / ALK PHOS: 77 U/L / ALT: 13 U/L / AST: 21 U/L / GGT: x           CARDIAC MARKERS ( 10 Sep 2021 16:55 )  x     / <0.01 ng/mL / x     / x     / x                    Serum Pro-Brain Natriuretic Peptide: 663 pg/mL (09-10-21 @ 16:55)        RADIOLOGY, ECG, & ADDITIONAL TESTS:  12 Lead ECG:   Ventricular Rate 79 BPM    Atrial Rate 79 BPM    P-R Interval 164 ms    QRS Duration 92 ms    Q-T Interval 398 ms    QTC Calculation(Bazett) 456 ms    P Axis 50 degrees    R Axis -15 degrees    T Axis 114 degrees    Diagnosis Line Normal sinus rhythm  ST & T wave abnormality, consider lateral ischemia  Abnormal ECG    Confirmed by ALEXA HOLBROOK MD ([id number]) on 9/11/2021 7:59:07 AM (09-10-21 @ 21:25)  12 Lead ECG:   Ventricular Rate 89 BPM    Atrial Rate 89 BPM    P-R Interval 134 ms    QRS Duration 84 ms    Q-T Interval 374 ms    QTC Calculation(Bazett) 455 ms    P Axis 67 degrees    R Axis 18 degrees    T Axis -27 degrees    Diagnosis Line Normal sinus rhythm  Low voltage QRS  T wave abnormality, consider inferior ischemia  Abnormal ECG    Confirmed by ALEXA HOLBROOK MD ([id number]) on 9/11/2021 7:59:04 AM (09-10-21 @ 16:42)      RECENT DIAGNOSTIC ORDERS:  12 Lead ECG (09-11-21 @ 13:08)  Xray Chest 1 View- PORTABLE-Urgent: Urgent   Indication: Shortness of Breath  Transport: Portable,  w/ Monitor (09-11-21 @ 13:08)  Troponin T, Serum: AM Sched. Collection: 12-Sep-2021 04:30 (09-11-21 @ 13:08)  Troponin T, Serum: STAT (09-11-21 @ 13:08)  COVID-19 Moses Domain Antibody: AM Sched. Collection: 11-Sep-2021 04:30 (09-11-21 @ 00:03)  Diet, DASH/TLC:   Sodium & Cholesterol Restricted  1000mL Fluid Restriction ([id number]) (09-10-21 @ 20:36)  Xray Chest 1 View-PORTABLE IMMEDIATE: IMMEDIATE   Indication: CHF  Transport: Portable  Exam Completed (09-10-21 @ 20:36)  Urinalysis: Routine (09-10-21 @ 20:36)   Renal: Routine   Indication: flank pain  Transport: Wheelchair (09-10-21 @ 20:36)  TTE Echo Complete w/o Contrast w/ Doppler:   Transport: Portable  Monitor: w/ Monitor (09-10-21 @ 20:36)  Culture - Urine: Routine  Specimen Source: Clean Catch (Midstream)  Addl Info: If indwelling urinary catheter > 14 days, obtain an order to remove and replace prior to c (09-10-21 @ 16:25)  Urinalysis: STAT (09-10-21 @ 16:25)      MEDICATIONS:  MEDICATIONS  (STANDING):  aspirin  chewable 81 milliGRAM(s) Oral daily  enoxaparin Injectable 40 milliGRAM(s) SubCutaneous daily  furosemide   Injectable 40 milliGRAM(s) IV Push two times a day  guaiFENesin  milliGRAM(s) Oral every 12 hours  influenza   Vaccine 0.5 milliLiter(s) IntraMuscular once  insulin glargine Injectable (LANTUS) 28 Unit(s) SubCutaneous at bedtime  insulin lispro (ADMELOG) corrective regimen sliding scale   SubCutaneous three times a day before meals  insulin lispro Injectable (ADMELOG) 9 Unit(s) SubCutaneous three times a day before meals  losartan 50 milliGRAM(s) Oral daily    MEDICATIONS  (PRN):      HOME MEDICATIONS:  aspirin 81 mg oral tablet, chewable (09-10)  candesartan 16 mg oral tablet (09-10)      PHYSICAL EXAM:  GENERAL: NAD, well-groomed, well-developed  HEAD:  Atraumatic, Normocephalic  EYES: EOMI, PERRLA, conjunctiva and sclera clear  NECK: Supple, No JVD  NERVOUS SYSTEM:  Alert & Oriented X3, Good concentration  CHEST/LUNG: Clear to auscultation bilaterally; No rales, rhonchi, wheezing, or rubs  HEART: Regular rate and rhythm; No murmurs, rubs, or gallops, no JVD  ABDOMEN: Soft, Nontender, Nondistended; Bowel sounds present  EXTREMITIES:  2+ Peripheral Pulses, venous stasis changes, non pitting edema  LYMPH: No lymphadenopathy noted  SKIN: No rashes or lesions      
  DEBBIE RHODES  81y  Female      Patient is a 81y old  Female who presents with a chief complaint of flank pain, shortness of breath (10 Sep 2021 20:18)      INTERVAL HPI/OVERNIGHT EVENTS: No acute overnight events. Pt found in her room resting comfortably in no acute distress.   She reports her shortness of breath improved but continues to have nocturnal dry cough and orthopnea. Denies any chest pain, palpitations, abdominal pain, n/v/fever, chills, dysuria. She's also complaining of constipation.      REVIEW OF SYSTEMS:  CONSTITUTIONAL: No fever, weight loss, or fatigue  EYES: No eye pain, visual disturbances, or discharge  ENMT:  No difficulty hearing, tinnitus, vertigo; No sinus or throat pain  NECK: No pain or stiffness  BREASTS: No pain, masses, or nipple discharge  RESPIRATORY: +dry cough, no wheezing, chills or hemoptysis; No shortness of breath  CARDIOVASCULAR: No chest pain, palpitations, dizziness, or leg swelling  GASTROINTESTINAL: No abdominal or epigastric pain. No nausea, vomiting, or hematemesis; No diarrhea or constipation. No melena or hematochezia.  GENITOURINARY: No dysuria, frequency, hematuria, or incontinence  NEUROLOGICAL: No headaches, memory loss, loss of strength, numbness, or tremors  SKIN: No itching, burning, rashes, or lesions   LYMPH NODES: No enlarged glands  ENDOCRINE: No heat or cold intolerance; No hair loss  MUSCULOSKELETAL: No joint pain or swelling; No muscle, back, or extremity pain  PSYCHIATRIC: No depression, anxiety, mood swings, or difficulty sleeping  HEME/LYMPH: No easy bruising, or bleeding gums  ALLERY AND IMMUNOLOGIC: No hives or eczema  FAMILY HISTORY:  No pertinent family history in first degree relatives      T(C): 36.6 (09-11-21 @ 04:55), Max: 36.9 (09-10-21 @ 15:24)  HR: 85 (09-11-21 @ 04:55) (74 - 85)  BP: 141/74 (09-11-21 @ 04:55) (126/71 - 141/74)  RR: 19 (09-11-21 @ 04:55) (17 - 19)  SpO2: 97% (09-10-21 @ 20:58) (97% - 97%)  Wt(kg): --Vital Signs Last 24 Hrs  T(C): 36.6 (11 Sep 2021 04:55), Max: 36.9 (10 Sep 2021 15:24)  T(F): 97.9 (11 Sep 2021 04:55), Max: 98.5 (10 Sep 2021 15:24)  HR: 85 (11 Sep 2021 04:55) (74 - 85)  BP: 141/74 (11 Sep 2021 04:55) (126/71 - 141/74)  BP(mean): --  RR: 19 (11 Sep 2021 04:55) (17 - 19)  SpO2: 97% (10 Sep 2021 20:58) (97% - 97%) on r  No Known Allergies      PHYSICAL EXAM:  GENERAL: NAD, well-groomed, well-developed  HEAD:  Atraumatic, Normocephalic  EYES: EOMI, PERRLA, conjunctiva and sclera clear  ENMT: No tonsillar erythema, exudates, or enlargement; Moist mucous membranes, Good dentition, No lesions  NECK: Supple, No JVD, Normal thyroid  NERVOUS SYSTEM:  Alert & Oriented X3, Good concentration; Motor Strength 5/5 B/L upper and lower extremities; DTRs 2+ intact and symmetric  CHEST/LUNG: Clear to percussion bilaterally; No rales, rhonchi, wheezing, or rubs  HEART: Regular rate and rhythm; No murmurs, rubs, or gallops, no JVD  ABDOMEN: Soft, Nontender, Nondistended; Bowel sounds present  EXTREMITIES:  2+ Peripheral Pulses, venous stasis changes, non pitting edema  LYMPH: No lymphadenopathy noted  SKIN: No rashes or lesions    Consultant(s) Notes Reviewed:  [x ] YES  [ ] NO  Care Discussed with Consultants/Other Providers [ x] YES  [ ] NO    LABS:                        12.6   8.59  )-----------( 189      ( 11 Sep 2021 04:30 )             37.3     09-11    140  |  101  |  21<H>  ----------------------------<  246<H>  4.4   |  25  |  1.2    Ca    9.3      11 Sep 2021 04:30  Phos  3.3     09-10  Mg     1.9     09-11    TPro  6.7  /  Alb  3.9  /  TBili  0.5  /  DBili  x   /  AST  21  /  ALT  13  /  AlkPhos  77  09-11    Serum Pro-Brain Natriuretic Peptide (09.10.21 @ 16:55)   Serum Pro-Brain Natriuretic Peptide: 663 pg/mL     RADIOLOGY & ADDITIONAL TESTS:  < from: Xray Chest 1 View- PORTABLE-Urgent (09.10.21 @ 16:59) >  Impression:    Diffuse bilateral pulmonary opacities.    Numerous radiodensities overlying the shoulders, may be external to the patient. Correlate clinically.    < end of copied text >        Imaging Personally Reviewed:  [ ] YES  [ ] NO  aspirin  chewable 81 milliGRAM(s) Oral daily  enoxaparin Injectable 40 milliGRAM(s) SubCutaneous daily  furosemide   Injectable 40 milliGRAM(s) IV Push two times a day  influenza   Vaccine 0.5 milliLiter(s) IntraMuscular once  insulin glargine Injectable (LANTUS) 28 Unit(s) SubCutaneous at bedtime  insulin lispro (ADMELOG) corrective regimen sliding scale   SubCutaneous three times a day before meals  insulin lispro Injectable (ADMELOG) 9 Unit(s) SubCutaneous three times a day before meals  losartan 50 milliGRAM(s) Oral daily      HEALTH ISSUES - PROBLEM Dx:        
  DEBBIE RHODES  81y, Female  Allergy: No Known Allergies    Hospital Day: 2d    Patient seen and examined earlier today. Feeling well, wants to be discharged, discussed with patient and family that we are pending echo.     PMH/PSH:  PAST MEDICAL & SURGICAL HISTORY:  HTN (hypertension)    HLD (hyperlipidemia)    DM (diabetes mellitus)    Gout    History of bilateral knee replacement        LAST 24-Hr EVENTS:    VITALS:  T(F): 97.9 (09-12-21 @ 13:10), Max: 97.9 (09-12-21 @ 13:10)  HR: 73 (09-12-21 @ 13:10)  BP: 129/62 (09-12-21 @ 13:10) (129/62 - 135/63)  RR: 18 (09-12-21 @ 13:10)  SpO2: --        TESTS & MEASUREMENTS:  Weight (Kg): 113.3 (09-10-21 @ 22:15)  BMI (kg/m2): 41.6 (09-10)    09-11-21 @ 07:01  -  09-12-21 @ 07:00  --------------------------------------------------------  IN: 350 mL / OUT: 850 mL / NET: -500 mL    09-12-21 @ 07:01  -  09-12-21 @ 15:08  --------------------------------------------------------  IN: 200 mL / OUT: 200 mL / NET: 0 mL                            12.6   9.39  )-----------( 183      ( 12 Sep 2021 05:30 )             37.9       09-12    134<L>  |  96<L>  |  27<H>  ----------------------------<  267<H>  3.8   |  24  |  1.4    Ca    9.1      12 Sep 2021 05:30  Phos  3.3     09-10  Mg     1.9     09-11    TPro  6.7  /  Alb  3.9  /  TBili  0.5  /  DBili  x   /  AST  21  /  ALT  13  /  AlkPhos  77  09-11    LIVER FUNCTIONS - ( 11 Sep 2021 04:30 )  Alb: 3.9 g/dL / Pro: 6.7 g/dL / ALK PHOS: 77 U/L / ALT: 13 U/L / AST: 21 U/L / GGT: x           CARDIAC MARKERS ( 12 Sep 2021 05:30 )  x     / <0.01 ng/mL / x     / x     / x      CARDIAC MARKERS ( 11 Sep 2021 13:33 )  x     / <0.01 ng/mL / x     / x     / x      CARDIAC MARKERS ( 10 Sep 2021 16:55 )  x     / <0.01 ng/mL / x     / x     / x                    Serum Pro-Brain Natriuretic Peptide: 663 pg/mL (09-10-21 @ 16:55)        RADIOLOGY, ECG, & ADDITIONAL TESTS:  12 Lead ECG:   Ventricular Rate 79 BPM    Atrial Rate 79 BPM    P-R Interval 170 ms    QRS Duration 90 ms    Q-T Interval 392 ms    QTC Calculation(Bazett) 449 ms    P Axis 35 degrees    R Axis -16 degrees    T Axis 118 degrees    Diagnosis Line Normal sinus rhythm  ST & T wave abnormality, consider lateral ischemia  Abnormal ECG    Confirmed by ALEXA HOLBROOK MD ([id number]) on 9/11/2021 4:16:19 PM (09-11-21 @ 13:32)  12 Lead ECG:   Ventricular Rate 79 BPM    Atrial Rate 79 BPM    P-R Interval 164 ms    QRS Duration 92 ms    Q-T Interval 398 ms    QTC Calculation(Bazett) 456 ms    P Axis 50 degrees    R Axis -15 degrees    T Axis 114 degrees    Diagnosis Line Normal sinus rhythm  ST & T wave abnormality, consider lateral ischemia  Abnormal ECG    Confirmed by ALEXA HOLBROOK MD ([id number]) on 9/11/2021 7:59:07 AM (09-10-21 @ 21:25)  12 Lead ECG:   Ventricular Rate 89 BPM    Atrial Rate 89 BPM    P-R Interval 134 ms    QRS Duration 84 ms    Q-T Interval 374 ms    QTC Calculation(Bazett) 455 ms    P Axis 67 degrees    R Axis 18 degrees    T Axis -27 degrees    Diagnosis Line Normal sinus rhythm  Low voltage QRS  T wave abnormality, consider inferior ischemia  Abnormal ECG    Confirmed by ALEXA HOLBROOK MD ([id number]) on 9/11/2021 7:59:04 AM (09-10-21 @ 16:42)      RECENT DIAGNOSTIC ORDERS:      MEDICATIONS:  MEDICATIONS  (STANDING):  aspirin  chewable 81 milliGRAM(s) Oral daily  enoxaparin Injectable 40 milliGRAM(s) SubCutaneous daily  furosemide   Injectable 40 milliGRAM(s) IV Push two times a day  guaiFENesin  milliGRAM(s) Oral every 12 hours  influenza   Vaccine 0.5 milliLiter(s) IntraMuscular once  insulin glargine Injectable (LANTUS) 28 Unit(s) SubCutaneous at bedtime  insulin lispro (ADMELOG) corrective regimen sliding scale   SubCutaneous three times a day before meals  insulin lispro Injectable (ADMELOG) 9 Unit(s) SubCutaneous three times a day before meals  losartan 50 milliGRAM(s) Oral daily    MEDICATIONS  (PRN):      HOME MEDICATIONS:  aspirin 81 mg oral tablet, chewable (09-10)  candesartan 16 mg oral tablet (09-10)      PHYSICAL EXAM:  GENERAL: NAD, well-groomed, well-developed  HEAD:  Atraumatic, Normocephalic  EYES: EOMI, PERRLA, conjunctiva and sclera clear  NECK: Supple, No JVD  NERVOUS SYSTEM:  Alert & Oriented X3, Good concentration  CHEST/LUNG: Clear to auscultation bilaterally; No rales, rhonchi, wheezing, or rubs  HEART: Regular rate and rhythm; No murmurs, rubs, or gallops, no JVD  ABDOMEN: Soft, Nontender, Nondistended; Bowel sounds present  EXTREMITIES:  2+ Peripheral Pulses, venous stasis changes, non pitting edema  LYMPH: No lymphadenopathy noted  SKIN: No rashes or lesions

## 2021-09-12 NOTE — PROGRESS NOTE ADULT - ASSESSMENT
Patient is a 81 year old Icelandic speaking female with a PMHx of HTN, HLD, DM2, Gout, CKD, and CHF? (unknown EF) p/w left sided flank pain for two weeks and weight gain/leg swelling/shortness of breath. Admitted for CHF exacerbation.     #Acute exacerbation of HF, unknown EF  - pt noncompliant with home diuretics due to flank pain   - CXR w/ diffuse b/l pulmonary opacities ,  ( no prior )   - saturating 97% on RA   - EKG unremarkable, trop negative   Plan:  - Follow up TTE, unknown EF  - strict I&O, daily weights, 1 L fluid restriction   - switch to PO diuresis, plan for dc tomorrow     #Left Flank Pain   - r/o stone vs hydronephrosis vs musculoskeletal etiology   - follow up US renal/bladder to assess for obstruction/hydronephrosis- negative   -Creat 1.2, unknown baseline  - follow up urinalysis     #Hypomagnesemia- repleted    #HTN (hypertension)- well controlled, c/w losartan     #HLD (hyperlipidemia)- follow up lipid panel     #DM (diabetes mellitus), A1C 9.1, basal bolus insulin regimen, SSI     #Gout- no evidence of acute flare       - DVT Prophylaxis: Lovenox      #Progress Note Handoff:  Pending (specify): TTE pending then dc tomorrow   d/w patient and son at bedside   Disposition: Home___/SNF___/Other________/Unknown at this time____x____    Cindy Llamas, DO

## 2021-09-13 ENCOUNTER — TRANSCRIPTION ENCOUNTER (OUTPATIENT)
Age: 81
End: 2021-09-13

## 2021-09-13 VITALS
TEMPERATURE: 98 F | RESPIRATION RATE: 18 BRPM | SYSTOLIC BLOOD PRESSURE: 128 MMHG | DIASTOLIC BLOOD PRESSURE: 63 MMHG | HEART RATE: 82 BPM

## 2021-09-13 LAB
ANION GAP SERPL CALC-SCNC: 13 MMOL/L — SIGNIFICANT CHANGE UP (ref 7–14)
BUN SERPL-MCNC: 23 MG/DL — HIGH (ref 10–20)
CALCIUM SERPL-MCNC: 9 MG/DL — SIGNIFICANT CHANGE UP (ref 8.5–10.1)
CHLORIDE SERPL-SCNC: 99 MMOL/L — SIGNIFICANT CHANGE UP (ref 98–110)
CO2 SERPL-SCNC: 25 MMOL/L — SIGNIFICANT CHANGE UP (ref 17–32)
CREAT SERPL-MCNC: 1.3 MG/DL — SIGNIFICANT CHANGE UP (ref 0.7–1.5)
GLUCOSE BLDC GLUCOMTR-MCNC: 193 MG/DL — HIGH (ref 70–99)
GLUCOSE BLDC GLUCOMTR-MCNC: 271 MG/DL — HIGH (ref 70–99)
GLUCOSE SERPL-MCNC: 294 MG/DL — HIGH (ref 70–99)
HCT VFR BLD CALC: 36.3 % — LOW (ref 37–47)
HGB BLD-MCNC: 12.1 G/DL — SIGNIFICANT CHANGE UP (ref 12–16)
MAGNESIUM SERPL-MCNC: 1.6 MG/DL — LOW (ref 1.8–2.4)
MCHC RBC-ENTMCNC: 29 PG — SIGNIFICANT CHANGE UP (ref 27–31)
MCHC RBC-ENTMCNC: 33.3 G/DL — SIGNIFICANT CHANGE UP (ref 32–37)
MCV RBC AUTO: 87.1 FL — SIGNIFICANT CHANGE UP (ref 81–99)
NRBC # BLD: 0 /100 WBCS — SIGNIFICANT CHANGE UP (ref 0–0)
PLATELET # BLD AUTO: 181 K/UL — SIGNIFICANT CHANGE UP (ref 130–400)
POTASSIUM SERPL-MCNC: 3.9 MMOL/L — SIGNIFICANT CHANGE UP (ref 3.5–5)
POTASSIUM SERPL-SCNC: 3.9 MMOL/L — SIGNIFICANT CHANGE UP (ref 3.5–5)
RBC # BLD: 4.17 M/UL — LOW (ref 4.2–5.4)
RBC # FLD: 13.5 % — SIGNIFICANT CHANGE UP (ref 11.5–14.5)
SODIUM SERPL-SCNC: 137 MMOL/L — SIGNIFICANT CHANGE UP (ref 135–146)
WBC # BLD: 9.86 K/UL — SIGNIFICANT CHANGE UP (ref 4.8–10.8)
WBC # FLD AUTO: 9.86 K/UL — SIGNIFICANT CHANGE UP (ref 4.8–10.8)

## 2021-09-13 PROCEDURE — 99239 HOSP IP/OBS DSCHRG MGMT >30: CPT

## 2021-09-13 RX ORDER — SODIUM CHLORIDE 0.65 %
2 AEROSOL, SPRAY (ML) NASAL
Qty: 1 | Refills: 0
Start: 2021-09-13 | End: 2021-09-17

## 2021-09-13 RX ORDER — CANDESARTAN CILEXETIL 8 MG/1
1 TABLET ORAL
Qty: 0 | Refills: 0 | DISCHARGE

## 2021-09-13 RX ORDER — FUROSEMIDE 40 MG
1 TABLET ORAL
Qty: 30 | Refills: 0
Start: 2021-09-13 | End: 2021-10-12

## 2021-09-13 RX ORDER — MAGNESIUM SULFATE 500 MG/ML
2 VIAL (ML) INJECTION ONCE
Refills: 0 | Status: COMPLETED | OUTPATIENT
Start: 2021-09-13 | End: 2021-09-13

## 2021-09-13 RX ORDER — ASPIRIN/CALCIUM CARB/MAGNESIUM 324 MG
1 TABLET ORAL
Qty: 0 | Refills: 0 | DISCHARGE

## 2021-09-13 RX ORDER — MAGNESIUM OXIDE 400 MG ORAL TABLET 241.3 MG
400 TABLET ORAL
Refills: 0 | Status: DISCONTINUED | OUTPATIENT
Start: 2021-09-13 | End: 2021-09-13

## 2021-09-13 RX ADMIN — ENOXAPARIN SODIUM 40 MILLIGRAM(S): 100 INJECTION SUBCUTANEOUS at 10:05

## 2021-09-13 RX ADMIN — Medication 10 MILLILITER(S): at 03:51

## 2021-09-13 RX ADMIN — LOSARTAN POTASSIUM 50 MILLIGRAM(S): 100 TABLET, FILM COATED ORAL at 06:10

## 2021-09-13 RX ADMIN — MAGNESIUM OXIDE 400 MG ORAL TABLET 400 MILLIGRAM(S): 241.3 TABLET ORAL at 13:01

## 2021-09-13 RX ADMIN — Medication 9 UNIT(S): at 12:21

## 2021-09-13 RX ADMIN — Medication 3: at 07:18

## 2021-09-13 RX ADMIN — Medication 9 UNIT(S): at 07:20

## 2021-09-13 RX ADMIN — Medication 40 MILLIGRAM(S): at 06:14

## 2021-09-13 RX ADMIN — Medication 81 MILLIGRAM(S): at 10:05

## 2021-09-13 RX ADMIN — Medication 600 MILLIGRAM(S): at 06:10

## 2021-09-13 NOTE — DISCHARGE NOTE PROVIDER - NSDCCPCAREPLAN_GEN_ALL_CORE_FT
PRINCIPAL DISCHARGE DIAGNOSIS  Diagnosis: CHF exacerbation  Assessment and Plan of Treatment: Congestive Heart Failure (CHF)  Congestive heart failure is a chronic condition in which the heart has trouble pumping blood. In some cases of heart failure, fluid may back up into your lungs or you may have swelling (edema) in your lower legs. There are many causes of heart failure including high blood pressure, coronary artery disease, abnormal heart valves, heart muscle disease, lung disease, diabetes, etc. Symptoms include shortness of breath with activity or when lying flat, cough, swelling of the legs, fatigue, or increased urination during the night.   Treatment is aimed at managing the symptoms of heart failure and may include lifestyle changes, medications, or surgical procedures. Take medicines only as directed by your health care provider and do not stop unless instructed to do so. Eat heart-healthy foods with low or no trans/saturated fats, cholesterol and salt. Weigh yourself every day for early recognition of fluid accumulation.  SEEK IMMEDIATE MEDICAL CARE IF YOU HAVE ANY OF THE FOLLOWING SYMPTOMS: shortness of breath, change in mental status, chest pain, lightheadedness/dizziness/fainting, or worsening of symptoms including not being able to conduct normal physical activity.  For your heart failure, we recommend you EITHER continue with your home dose of furosemide OR take 1tablet of furosemide 40mg daily.   We weren't able to get your list of home medications during this admission but recommend you continue taking your prescribed blood pressure medications and home dose of insulin.        SECONDARY DISCHARGE DIAGNOSES  Diagnosis: Hyperglycemia  Assessment and Plan of Treatment: Hyperglycemia  Hyperglycemia occurs when the glucose (sugar) level in your blood is too high. Symptoms include increased urination, increased thirst, a dry mouth, or changes in appetite. If started on a medication, take exactly as prescribed by your health care professional. Maintain a healthy lifestyle and follow up with your primary care physician.  SEEK IMMEDIATE MEDICAL CARE IF YOU HAVE ANY OF THE FOLLOWING SYMPTOMS: shortness of breath, change in mental status, nausea or vomiting, fruity smell to your breath, or any signs of dehydration.  Continue taking your home dose of insulin before meals and at bedtime. Monitor your blood glucose levels as well.    Diagnosis: Hypomagnesemia  Assessment and Plan of Treatment:     Diagnosis: Upper respiratory infection  Assessment and Plan of Treatment:

## 2021-09-13 NOTE — DISCHARGE NOTE PROVIDER - HOSPITAL COURSE
Patient is a 81 year old female with a PMHx of HTN, HLD, DMT2, Gout, CKD, and CHF? (unknown EF) was brought to the hospital due to left sided flank pain for two weeks. The patient notes that due to this pain. The pain is described as constant, non-radiating pain. Denies frequency, urgency, hematuria, nausea, vomiting, diarrhea, fever, or chills. Denies prior episodes of this pain. Due to this pain, the patient discontinue her "water pill" and has notice increased weight gain, leg swelling, increased cough, fatigue, shortness of breath with exertion.     In the ED, patient is was hemodynamically stable. Labs showing , Mg of 1.5 and . CXR showing pulmonary edema. The patient will be admitted to medicine for further management and CXR significant for pulmonary vascular congestion and basilar opacities/effusions. Pt was diuresed with 40mg IV lasix BID for 2 days and transitioned to po lasix 40mg bid. She's now euvolemic.  Her echo was remarkable for grade 1 diastolic dysfunction and EF of 55-60%.   Pt also had a renal abdominal ultrasound done which revealed a 4mm left renal stone non obstructing without hydronephrosis. She denied any pain to flank area.  In the last 24hrs pt  complained of dry cough, nasal congestion, and a sore throat. She was hemodynamically stable.    Patient is a 81 year old Yi speaking female with a PMHx of HTN, HLD, DM2, Gout, CKD, and CHF (EF 50-55%) p/w left sided flank pain for two weeks and weight gain/leg swelling/shortness of breath. Admitted for CHF exacerbation.     #Acute exacerbation of HF, EF 50-55%  - pt noncompliant with home diuretics due to flank pain   - CXR w/ diffuse b/l pulmonary opacities ,  ( no prior )   - saturating 97% on RA   - EKG unremarkable, trop negative   -euvolemic on exam  - switched to PO diuresis, plan for dc tomorrow     #Left Flank Pain   - r/o stone vs hydronephrosis vs musculoskeletal etiology   - follow up US renal/bladder to assess for obstruction/hydronephrosis- negative   -Creat 1.2, unknown baseline  - follow up urinalysis     #Hypomagnesemia- repleted    #HTN (hypertension)- well controlled, c/w losartan     #HLD (hyperlipidemia)- follow up lipid panel     #DM (diabetes mellitus), A1C 9.1, basal bolus insulin regimen, SSI     #Gout- no evidence of acute flare       - DVT Prophylaxis: Lovenox      #Progress Note Handoff:  Pending (specify): none   d/w patient and son at bedside   Disposition: Home___/SNF___/Other________/Unknown at this time____x____     Patient is a 81 year old female with a PMHx of HTN, HLD, DMT2, Gout, CKD, and CHF? (unknown EF) was brought to the hospital due to left sided flank pain for two weeks. The patient notes that due to this pain. The pain is described as constant, non-radiating pain. Denies frequency, urgency, hematuria, nausea, vomiting, diarrhea, fever, or chills. Denies prior episodes of this pain. Due to this pain, the patient discontinue her "water pill" and has notice increased weight gain, leg swelling, increased cough, fatigue, shortness of breath with exertion.     In the ED, patient is was hemodynamically stable. Labs showing , Mg of 1.5 and . CXR showing pulmonary edema. The patient will be admitted to medicine for further management and CXR significant for pulmonary vascular congestion and basilar opacities/effusions. Pt was diuresed with 40mg IV lasix BID for 2 days and transitioned to po lasix 40mg bid. She's now euvolemic.  Her echo was remarkable for grade 1 diastolic dysfunction and EF of 55-60%.   Pt also had a renal abdominal ultrasound done which revealed a 4mm left renal stone non obstructing without hydronephrosis. She denied any pain to flank area.  In the last 24hrs pt  complained of dry cough, nasal congestion, and a sore throat. She was hemodynamically stable.    Patient is a 81 year old Faroese speaking female with a PMHx of HTN, HLD, DM2, Gout, CKD, and CHF (EF 50-55%) p/w left sided flank pain for two weeks and weight gain/leg swelling/shortness of breath. Admitted for CHF exacerbation.     #Acute exacerbation of HF, EF 50-55%  - pt noncompliant with home diuretics due to flank pain   - CXR w/ diffuse b/l pulmonary opacities ,  ( no prior )   - saturating 97% on RA   - EKG unremarkable, trop negative   -euvolemic on exam  -switched to PO furosemide 40mg qd    #Left Flank Pain 2/2 nephrolithiasis   -renal US> 4mm non obstructive stone, no hydronephrosis  -Creat 1.2, unknown baseline  -Outpatient follow up to strain urine  -Pain control with tylenol     #Dry cough and sore throat likely 2/2 viral uri  -Pt declined RVP  -Will send home with robitussin  -Flonase nasal spray    #Hypomagnesemia  - repleted with po magnesium    #HTN (hypertension)  well controlled, c/w losartan     #HLD (hyperlipidemia)-   -, HDL 46, LDL 76  -start atorvastatin 10mg po qd  -Or c/w home meds?    #DM (diabetes mellitus),  -A1C 9.1, basal bolus insulin regimen, SSI  - c/w home dose of insulin    #Gout- no evidence of acute flare       - DVT Prophylaxis: Lovenox      #Progress Note Handoff:  Pending (specify): none   d/w patient and son at bedside   Disposition: Home     Patient is a 81 year old female with a PMHx of HTN, HLD, DMT2, Gout, CKD, and CHF? (unknown EF) was brought to the hospital due to left sided flank pain for two weeks. The patient notes that due to this pain. The pain is described as constant, non-radiating pain. Denies frequency, urgency, hematuria, nausea, vomiting, diarrhea, fever, or chills. Denies prior episodes of this pain. Due to this pain, the patient discontinue her "water pill" and has notice increased weight gain, leg swelling, increased cough, fatigue, shortness of breath with exertion.     In the ED, patient is was hemodynamically stable. Labs showing , Mg of 1.5 and . CXR showing pulmonary edema. The patient will be admitted to medicine for further management and CXR significant for pulmonary vascular congestion and basilar opacities/effusions. Pt was diuresed with 40mg IV lasix BID for 2 days and transitioned to po lasix 40mg bid. She's now euvolemic.  Her echo was remarkable for grade 1 diastolic dysfunction and EF of 55-60%.   Pt also had a renal abdominal ultrasound done which revealed a 4mm left renal stone non obstructing without hydronephrosis. She denied any pain to flank area.  In the last 24hrs pt  complained of dry cough, nasal congestion, and a sore throat. She was hemodynamically stable.    Patient is a 81 year old Nepali speaking female with a PMHx of HTN, HLD, DM2, Gout, CKD, and CHF (EF 50-55%) p/w left sided flank pain for two weeks and weight gain/leg swelling/shortness of breath. Admitted for CHF exacerbation.     #Acute exacerbation of HFpEF, EF 50-55%  - pt noncompliant with home diuretics due to flank pain   - CXR w/ diffuse b/l pulmonary opacities ,  ( no prior )   - saturating 97% on RA   - EKG unremarkable, trop negative   -switched to PO furosemide 40mg qd    #Left Flank Pain- resolved    -renal US> 4mm non obstructive stone, no hydronephrosis  -Creat 1.2, unknown baseline  -Outpatient follow up to strain urine  -Pain control with tylenol     #Dry cough and sore throat likely 2/2 viral uri  -Pt declined RVP  -Will send home with robitussin  -Flonase nasal spray    #Hypomagnesemia  - repleted with po magnesium    #HTN (hypertension)  well controlled, c/w losartan     #HLD (hyperlipidemia)-   -, HDL 46, LDL 76  -start atorvastatin 10mg po qd  -Or c/w home meds?    #DM (diabetes mellitus),  -A1C 9.1, basal bolus insulin regimen, SSI  - c/w home dose of insulin    #Gout- no evidence of acute flare           Attending Attestation:   Patient seen and examined on day of discharge.     GENERAL: NAD, well-groomed, well-developed  HEAD:  Atraumatic, Normocephalic  EYES: EOMI, PERRLA, conjunctiva and sclera clear  NECK: Supple, No JVD  NERVOUS SYSTEM:  Alert & Oriented X3, Good concentration  CHEST/LUNG: Clear to auscultation bilaterally; No rales, rhonchi, wheezing, or rubs  HEART: Regular rate and rhythm; No murmurs, rubs, or gallops, no JVD  ABDOMEN: Soft, Nontender, Nondistended; Bowel sounds present  EXTREMITIES:  2+ Peripheral Pulses, venous stasis changes, non pitting edema  LYMPH: No lymphadenopathy noted  SKIN: No rashes or lesions    I have spent >30m preparing discharge and counselling patient and family on discharge instructions.   Cindy Llamas, DO

## 2021-09-13 NOTE — DISCHARGE NOTE NURSING/CASE MANAGEMENT/SOCIAL WORK - PATIENT PORTAL LINK FT
You can access the FollowMyHealth Patient Portal offered by Health system by registering at the following website: [email or web address]. By joining [employer]’s FollowMyHealth portal, you will also be able to view your health information using other applications (apps) compatible with our system.

## 2021-09-13 NOTE — DISCHARGE NOTE NURSING/CASE MANAGEMENT/SOCIAL WORK - NSDCPEFALRISK_GEN_ALL_CORE
For information on Fall & injury Prevention, visit [email or web address]

## 2021-09-13 NOTE — CONSULT NOTE ADULT - ASSESSMENT
Acute on chronic HFpEF    Patient is euvolemic on exam  Get BMP daily   Maintain potassium >4.0, Mg >2.1  Strict intake and output  Daily weight    Acute on chronic HFpEF    Patient is close to euvolemic on exam  Continue Furosemide 40 mg PO daily  Continue Losartan 50 mg PO daily  Get BMP daily   Maintain potassium >4.0, Mg >2.1  Strict intake and output  Daily weight

## 2021-09-13 NOTE — DISCHARGE NOTE PROVIDER - NSDCMRMEDTOKEN_GEN_ALL_CORE_FT
aspirin 81 mg oral tablet, chewable: 1 tab(s) orally once a day  candesartan 16 mg oral tablet: 1 tab(s) orally once a day   furosemide 40 mg oral tablet: 1 tab(s) orally once a day  guaiFENesin 600 mg oral tablet, extended release: 1 tab(s) orally every 12 hours  Ocean 0.65% nasal spray: 2 spray(s) intranasally 2 times a day

## 2021-09-13 NOTE — CONSULT NOTE ADULT - SUBJECTIVE AND OBJECTIVE BOX
Date of Admission:    CHIEF COMPLAINT:    HISTORY OF PRESENT ILLNESS: 81yFemale with PMH below presented to the hospital for     PAST MEDICAL & SURGICAL HISTORY:  HTN (hypertension)    HLD (hyperlipidemia)    DM (diabetes mellitus)    Gout    History of bilateral knee replacement      FAMILY HISTORY:    No pertinent family history of premature cardiovascular disease in first degree relatives.    SOCIAL HISTORY:      - Denies smoking, alcohol or drug use    Allergies    No Known Allergies    Intolerances    	    REVIEW OF SYSTEMS:    CONSTITUTIONAL: No fever, + weight gain, no fatigue  CARDIOLOGY: denies chest pain, shortness of breath or syncopal episodes., ble edema improved now  RESPIRATORY: denies shortness of breath  NEUROLOGICAL: NO weakness, no focal deficits to report.  ENDOCRINOLOGICAL: no recent change in diabetic medications.   GI: no BRBPR, no N,V, diarrhea.    PSYCHIATRY: normal mood and affect  HEENT: no nasal discharge, no ecchymosis  SKIN: no ecchymosis, no breakdown  MUSCULOSKELETAL: Full range of motion x4.     PHYSICAL EXAM:    General Appearance: well appearing, normal for age and gender. 	  Neck: normal JVP, no bruit.   Eyes: Extra Ocular muscles intact.   Cardiovascular: regular rate and rhythm S1 S2, No JVD, No murmurs, No BLE edema  Respiratory: Lungs clear to auscultation	  Psychiatry: Alert and oriented x 3, Mood & affect appropriate  Gastrointestinal:  Soft, Non-tender  Skin/Integumen: No rashes, No ecchymoses, No cyanosis	  Neurologic: Non-focal  Musculoskeletal/ extremities: Normal range of motion, No clubbing, cyanosis or edema  Vascular: Peripheral pulses palpable 2+ bilaterally    PREVIOUS DIAGNOSTIC TESTING:      TTE 9/12/21    Summary:   1. Left ventricular ejection fraction, by visual estimation, is 50 to 55%.   2. Technically difficult study.   3. Moderately increased left ventricular internal cavity size.   4. Spectral Doppler shows impaired relaxation pattern of left ventricular myocardial filling (Grade I diastolic dysfunction).   5. Mildly enlarged left atrium.   6. Normal right atrial size.   7. Mild mitral annular calcification.   8. Mild thickening of the anterior and posterior mitral valve leaflets.   9. No evidence of mitral valve regurgitation.  10. Dilatation of the aortic root.      Home Medications:    MEDICATIONS  (STANDING):  aspirin  chewable 81 milliGRAM(s) Oral daily  enoxaparin Injectable 40 milliGRAM(s) SubCutaneous daily  furosemide    Tablet 40 milliGRAM(s) Oral daily  guaiFENesin  milliGRAM(s) Oral every 12 hours  influenza   Vaccine 0.5 milliLiter(s) IntraMuscular once  insulin glargine Injectable (LANTUS) 33 Unit(s) SubCutaneous at bedtime  insulin lispro (ADMELOG) corrective regimen sliding scale   SubCutaneous three times a day before meals  insulin lispro Injectable (ADMELOG) 9 Unit(s) SubCutaneous three times a day before meals  losartan 50 milliGRAM(s) Oral daily  magnesium oxide 400 milliGRAM(s) Oral three times a day with meals    MEDICATIONS  (PRN):  benzocaine 20% Spray 1 Spray(s) Mucosal three times a day PRN sore throat         Date of Admission: 9/10/21    HISTORY OF PRESENT ILLNESS:    Patient is a 81 year old female with a PMHx of HTN, HLD, DMT2, Gout, CKD, and CHF? (unknown EF) was brought to the hospital due to left sided flank pain for two weeks. The patient notes that due to this pain. The pain is described as constant, non-radiating pain. Denies frequency, urgency, hematuria, nausea, vomiting, diarrhea, fever, or chills. Denies prior episodes of this pain. Due to this pain, the patient discontinue her "water pill" and has notice increased weight gain, leg swelling, increased cough, fatigue, shortness of breath with exertion. In the ED, patient is was hemodynamically stable. Labs showing Mg of 1.5 and . CXR showing pulmonary edema. The patient will be admitted to medicine for further management.     Patient speaks German, prefers her grandson at the bedside as her . Patient reports few days before admission, she had stopped her diuretics on her own, and developed SOB and BLE edema which are improved now. She is not very active at the baseline however she was SOB when walking few steps at home. Patient denies c/p, palpitations, headaches, bleeding, LH, dizziness, orthopnea, PND, LOC, cough, feeling bloated, N/V/D, or musculoskeletal pain.        PAST MEDICAL & SURGICAL HISTORY:    HTN (hypertension)  HLD (hyperlipidemia)  DM (diabetes mellitus)  Gout  History of bilateral knee replacement      FAMILY HISTORY:    No pertinent family history of premature cardiovascular disease in first degree relatives.    SOCIAL HISTORY:      - Denies smoking, alcohol or drug use    Allergies    No Known Allergies    Intolerances    	    REVIEW OF SYSTEMS:    CONSTITUTIONAL: No fever, + weight gain, no fatigue  CARDIOLOGY: denies chest pain, + shortness of breath or syncopal episodes., ble edema improved now  RESPIRATORY: + shortness of breath, +cough  NEUROLOGICAL: NO weakness, no focal deficits to report.  ENDOCRINOLOGICAL: no recent change in diabetic medications.   GI: no BRBPR, no N,V, diarrhea.    PSYCHIATRY: normal mood and affect  HEENT: no nasal discharge, no ecchymosis  SKIN: no ecchymosis, no breakdown  MUSCULOSKELETAL: Full range of motion x4.     PHYSICAL EXAM:    General Appearance: well appearing, normal for age and gender. 	  Neck: normal JVP, no bruit.   Eyes: Extra Ocular muscles intact.   Cardiovascular: regular rate and rhythm S1 S2, No JVD, No murmurs, No BLE edema  Respiratory: Lungs clear to auscultation	  Psychiatry: Alert and oriented x 3, Mood & affect appropriate  Gastrointestinal:  obese, soft, Non-tender  Skin/Integumen: No rashes, No ecchymoses, No cyanosis	  Neurologic: Non-focal  Musculoskeletal/ extremities: Normal range of motion, No clubbing, cyanosis or edema  Vascular: Peripheral pulses palpable 2+ bilaterally    PREVIOUS DIAGNOSTIC TESTING:      TTE 9/12/21    Summary:   1. Left ventricular ejection fraction, by visual estimation, is 50 to 55%.   2. Technically difficult study.   3. Moderately increased left ventricular internal cavity size.   4. Spectral Doppler shows impaired relaxation pattern of left ventricular myocardial filling (Grade I diastolic dysfunction).   5. Mildly enlarged left atrium.   6. Normal right atrial size.   7. Mild mitral annular calcification.   8. Mild thickening of the anterior and posterior mitral valve leaflets.   9. No evidence of mitral valve regurgitation.  10. Dilatation of the aortic root.      Home Medications:    MEDICATIONS  (STANDING):  aspirin  chewable 81 milliGRAM(s) Oral daily  enoxaparin Injectable 40 milliGRAM(s) SubCutaneous daily  furosemide    Tablet 40 milliGRAM(s) Oral daily  guaiFENesin  milliGRAM(s) Oral every 12 hours  influenza   Vaccine 0.5 milliLiter(s) IntraMuscular once  insulin glargine Injectable (LANTUS) 33 Unit(s) SubCutaneous at bedtime  insulin lispro (ADMELOG) corrective regimen sliding scale   SubCutaneous three times a day before meals  insulin lispro Injectable (ADMELOG) 9 Unit(s) SubCutaneous three times a day before meals  losartan 50 milliGRAM(s) Oral daily  magnesium oxide 400 milliGRAM(s) Oral three times a day with meals    MEDICATIONS  (PRN):  benzocaine 20% Spray 1 Spray(s) Mucosal three times a day PRN sore throat

## 2021-09-13 NOTE — CONSULT NOTE ADULT - ATTENDING COMMENTS
Attending attestation statement: I have personally seen and examined the patient. I fully participated in the care of this patient. I have made amendments to the documentation where necessary and agree with the history, physical exam and plan as documented by the NP with the following additions/ amendments:    80 yo F with HTN, DM, CKD, gout, HFpEF (EF 55-60%) who presented with flank pain and was found to be fluid overloaded with elevated BNP, pulmonary edema pattern on XRAY and lower extremity edema. Admitted for the management of acute on chronic diastolic CHF exacerbation.    CHF Exacerbation likely due to cessation of diuretics for a few days  Resume home medications  Discharge on 40mg PO Lasix with close outpatient follow up of renal function and electrolytes

## 2021-09-13 NOTE — CHART NOTE - NSCHARTNOTEFT_GEN_A_CORE
<<<RESIDENT DISCHARGE NOTE>>>     DEBBIE RHODES  MRN-[id number]    Pt seen and evaluated at bedside complaining of dry cough and nasal congestion. Pt euvolemic on exam and in no acute respiratory distress.  VITAL SIGNS:  T(F): 98.2 (09-13-21 @ 05:30), Max: 98.9 (09-12-21 @ 21:15)  HR: 94 (09-13-21 @ 05:30)  BP: 147/87 (09-13-21 @ 05:30)  SpO2: --      PHYSICAL EXAMINATION:  General: Well appearing, no acute distress  Head & Neck: Normocephalic, atraumatic, no sinus tenderness, no pharyngeal exudates  Pulmonary: Lungs clear to auscultation bilaterally, no wheezing ronchi, rales  Cardiovascular: Regular rate, normal rhythm, S1&S2 auscultated  Gastrointestinal/Abdomen & Pelvis: Soft, nontender, nondistended, (+) bowel sounds  Neurologic/Motor: CN II-XII grossly intact, AAOx4    TEST RESULTS:                        12.1   9.86  )-----------( 181      ( 13 Sep 2021 06:04 )             36.3       09-13    137  |  99  |  23<H>  ----------------------------<  294<H>  3.9   |  25  |  1.3    Ca    9.0      13 Sep 2021 06:04  Mg     1.6     09-13        FINAL DISCHARGE INTERVIEW:  Resident Present: Jamil Rodriguez-david, Marcus Luke MD, Cindy Llamas DO    DISCHARGE MEDICATION RECONCILIATION  reviewed with Attending (Name:___________)    DISPOSITION:  Home, Home with Visiting Nursing Services,  SNF/ NH,  Acute Rehab (4A),  Other

## 2021-09-13 NOTE — DISCHARGE NOTE PROVIDER - NSDCFUADDAPPT_GEN_ALL_CORE_FT
Please follow up with PCP     If patient feels dizzy or lightheaded, please check blood pressure, if it is low then please follow up with a physician since the water pill can sometimes make you dehydrated.     You were receiving 40mg Furosemide here in the hospital, and tolerated this medicine well. Please continue this dose of Furosemide ( Lasix ) and stop taking the dose you have at home.     If you notice that you have increased swelling in your legs or difficulty breathing, please come to the hospital since this would indicate that your heart failure is worsening.     Please find a Cardiologist to follow up with.     Please take your home medications as previously prescribed since we do not have a list of your home medications.

## 2021-09-15 DIAGNOSIS — E11.22 TYPE 2 DIABETES MELLITUS WITH DIABETIC CHRONIC KIDNEY DISEASE: ICD-10-CM

## 2021-09-15 DIAGNOSIS — I13.0 HYPERTENSIVE HEART AND CHRONIC KIDNEY DISEASE WITH HEART FAILURE AND STAGE 1 THROUGH STAGE 4 CHRONIC KIDNEY DISEASE, OR UNSPECIFIED CHRONIC KIDNEY DISEASE: ICD-10-CM

## 2021-09-15 DIAGNOSIS — J06.9 ACUTE UPPER RESPIRATORY INFECTION, UNSPECIFIED: ICD-10-CM

## 2021-09-15 DIAGNOSIS — N18.30 CHRONIC KIDNEY DISEASE, STAGE 3 UNSPECIFIED: ICD-10-CM

## 2021-09-15 DIAGNOSIS — I50.33 ACUTE ON CHRONIC DIASTOLIC (CONGESTIVE) HEART FAILURE: ICD-10-CM

## 2021-09-15 DIAGNOSIS — R10.9 UNSPECIFIED ABDOMINAL PAIN: ICD-10-CM

## 2021-09-15 DIAGNOSIS — E83.42 HYPOMAGNESEMIA: ICD-10-CM

## 2021-09-15 DIAGNOSIS — E11.65 TYPE 2 DIABETES MELLITUS WITH HYPERGLYCEMIA: ICD-10-CM

## 2021-09-15 DIAGNOSIS — I50.9 HEART FAILURE, UNSPECIFIED: ICD-10-CM

## 2021-09-15 DIAGNOSIS — E78.5 HYPERLIPIDEMIA, UNSPECIFIED: ICD-10-CM

## 2021-09-15 DIAGNOSIS — M10.9 GOUT, UNSPECIFIED: ICD-10-CM

## 2023-12-09 ENCOUNTER — EMERGENCY (EMERGENCY)
Facility: HOSPITAL | Age: 83
LOS: 0 days | Discharge: HOME | End: 2023-12-09
Attending: EMERGENCY MEDICINE
Payer: COMMERCIAL

## 2023-12-09 VITALS
WEIGHT: 175.05 LBS | OXYGEN SATURATION: 95 % | RESPIRATION RATE: 18 BRPM | DIASTOLIC BLOOD PRESSURE: 67 MMHG | HEIGHT: 60 IN | SYSTOLIC BLOOD PRESSURE: 115 MMHG | HEART RATE: 88 BPM | TEMPERATURE: 98 F

## 2023-12-09 DIAGNOSIS — Z96.653 PRESENCE OF ARTIFICIAL KNEE JOINT, BILATERAL: Chronic | ICD-10-CM

## 2023-12-09 DIAGNOSIS — I10 ESSENTIAL (PRIMARY) HYPERTENSION: ICD-10-CM

## 2023-12-09 DIAGNOSIS — G89.29 OTHER CHRONIC PAIN: ICD-10-CM

## 2023-12-09 DIAGNOSIS — M25.512 PAIN IN LEFT SHOULDER: ICD-10-CM

## 2023-12-09 DIAGNOSIS — E78.5 HYPERLIPIDEMIA, UNSPECIFIED: ICD-10-CM

## 2023-12-09 DIAGNOSIS — E11.9 TYPE 2 DIABETES MELLITUS WITHOUT COMPLICATIONS: ICD-10-CM

## 2023-12-09 PROCEDURE — 73030 X-RAY EXAM OF SHOULDER: CPT | Mod: LT

## 2023-12-09 PROCEDURE — 99284 EMERGENCY DEPT VISIT MOD MDM: CPT

## 2023-12-09 PROCEDURE — 73030 X-RAY EXAM OF SHOULDER: CPT | Mod: 26,LT

## 2023-12-09 PROCEDURE — 99283 EMERGENCY DEPT VISIT LOW MDM: CPT | Mod: 25

## 2023-12-09 RX ORDER — ACETAMINOPHEN 500 MG
650 TABLET ORAL ONCE
Refills: 0 | Status: COMPLETED | OUTPATIENT
Start: 2023-12-09 | End: 2023-12-09

## 2023-12-09 RX ADMIN — Medication 650 MILLIGRAM(S): at 19:00

## 2023-12-09 NOTE — ED PROVIDER NOTE - PATIENT PORTAL LINK FT
You can access the FollowMyHealth Patient Portal offered by [facility] by registering at the following website: [email or web address]. By joining [employer]’s FollowMyHealth portal, you will also be able to view your health information using other applications (apps) compatible with our system. You can access the FollowMyHealth Patient Portal offered by [facility] by registering at the following website: [email or web address]. By joining [employer]’s FollowMyHealth portal, you will also be able to view your health information using other applications (apps) compatible with our system.

## 2023-12-09 NOTE — ED ADULT NURSE NOTE - OBJECTIVE STATEMENT
Patient presents to ED for c/o L arm and shoulder pain x2 weeks. Family reports hx of arthritis and dementia. As per pt family, pt h/o arthritis, reports pain medications she is on is not enough

## 2023-12-09 NOTE — ED PROVIDER NOTE - CARE PROVIDER_API CALL
Jasmyne Xavier [facility]  Orthopaedic Surgery  [address] [place] [address]  Phone: [phone]  Fax: [phone]  Follow Up Time: 1-3 Days   Jasmyne Xavier [facility]  Orthopaedic Surgery  [address] [place] [address]  Phone: [phone]  Fax: [phone]  Follow Up Time: 1-3 Days

## 2023-12-09 NOTE — ED PROVIDER NOTE - PROVIDER TOKENS
PROVIDER:[TOKEN:[[id number]],FOLLOWUP:[1-3 Days]] PROVIDER:[TOKEN:[[id number]],FOLLOWUP:[1-3 Days]]

## 2023-12-09 NOTE — ED ADULT NURSE NOTE - NSFALLLASTSIX_ED_ALL_ED
[FreeTextEntry1] : Bilateral DDH\par \par The patient will require  operative intervention at least on the L side. I will wait until patient is about 6 months.\par \par Encounter time: 20 min No.

## 2023-12-09 NOTE — ED PROVIDER NOTE - WET READ LAUNCH FT
Duration Of Freeze Thaw-Cycle (Seconds): 3 Consent: The patient's consent was obtained including but not limited to risks of crusting, scabbing, blistering, scarring, darker or lighter pigmentary change, recurrence, incomplete removal and infection. Detail Level: Detailed Post-Care Instructions: I reviewed with the patient in detail post-care instructions. Patient is to wear sunprotection, and avoid picking at any of the treated lesions. Pt may apply Vaseline to crusted or scabbing areas. Render Post-Care Instructions In Note?: no Number Of Freeze-Thaw Cycles: 3 freeze-thaw cycles There are no Wet Read(s) to document.

## 2023-12-09 NOTE — ED PROVIDER NOTE - PHYSICAL EXAMINATION
Evaluated patient in [address] [facility],[address] at bedside S/P left minimally invasive parathyroidectomy. . Patient is alert and coherent, in no acute distress. Currently reports 4/10 pain at her incision site which is tolerable. Patient denies numbness or tingling around her mouth, fingertips, and toes. Denies shortness of breath and difficulty swallowing. She reports that she is eating and drinking without concern as well as tolerating diet without N/V. Incision is covered in Steri-Strips that are not saturated in blood. No evidence of hematoma. Site is clean, dry, and intact. post op calcium levels are all within normal limits. Patient ready for discharge and understands to go to the ED if she develops dysphagia, difficulty breathing, or hematoma under her incision. VITAL SIGNS: I have reviewed nursing notes and confirm.  CONSTITUTIONAL: Well-appearing, non-toxic, in NAD  SKIN: Warm dry, normal skin turgor  HEAD: NCAT  EYES: No conjunctival injection, scleral anicteric  ENT: MMM  NECK: Supple; FROM.   EXT: Patient can transition with assistance from wheelchair to stretcher. +anterior left shoulder pain, worse with ranging, no obvious deformity or dislcoation. No knee pain, neg SLR b/l.   NEURO: Grossly normal examination, CN grossly intact  PSYCH: Cooperative, appropriate

## 2023-12-09 NOTE — ED ADULT TRIAGE NOTE - CHIEF COMPLAINT QUOTE
Pt c/o L arm and shoulder pain and b/l leg pains x2 weeks. As per pt family, pt h/o arthritis, reports pain medications she is on is not enough,

## 2023-12-09 NOTE — ED PROVIDER NOTE - NSPTACCESSSVCSAPPTDETAILS_ED_ALL_ED_FT
Acute on chronic lefrt shoulder arthritis, no fracture or dislocation in ED.     ALSO NEEDS PAIN MANAGEMENT REFERRAL.     Call daughter Joyce at [phone] for scheduling. Acute on chronic lefrt shoulder arthritis, no fracture or dislocation in ED.     ALSO NEEDS PAIN MANAGEMENT REFERRAL.     Call daughter Joyce at [phone] for scheduling.

## 2023-12-09 NOTE — ED PROVIDER NOTE - OBJECTIVE STATEMENT
82yo female PMHx HLD, DM, HTN, arthritis presenting with her daughter for evaluation of atraumatic left shoulder pain for the past several weeks.  Pain is worse with movement of the joint, no associated weakness, paresthesias, skin changes, chest pain shortness of breath or any other additional complaints. Patient was reportedly advised to take Aleve, it is not helping much.

## 2023-12-09 NOTE — ED PROVIDER NOTE - NSFOLLOWUPINSTRUCTIONS_ED_ALL_ED_FT
Orthopedics/pain management follow-up: Our Emergency Department Referral Coordinators will be reaching out to you in the next 24-48 hours from 9:00am to 5:00pm with a follow up appointment. Please expect a phone call from the hospital in that time frame. If you do not receive a call or if you have any questions or concerns, you can reach them at [phone]    A referral for an orthopedic surgeon is included if you would like to get a head start on getting something scheduled.       Shoulder Pain    Many things can cause shoulder pain, including:    An injury to the area.  Overuse of the shoulder.  Arthritis.    The source of the pain can be:    Inflammation.  An injury to the shoulder joint.  An injury to a tendon, ligament, or bone.    HOME CARE INSTRUCTIONS  Take these actions to help with your pain:     Squeeze a soft ball or a foam pad as much as possible. This helps to keep the shoulder from swelling. It also helps to strengthen the arm.  Take over-the-counter and prescription medicines only as told by your health care provider.  If directed, apply ice to the area:  Put ice in a plastic bag.  Place a towel between your skin and the bag.  Leave the ice on for 20 minutes, 2–3 times per day. Stop applying ice if it does not help with the pain.  If you were given a shoulder sling or immobilizer:  Wear it as told.  Remove it to shower or bathe.  Move your arm as little as possible, but keep your hand moving to prevent swelling.    SEEK MEDICAL CARE IF:  Your pain gets worse.  Your pain is not relieved with medicines.  New pain develops in your arm, hand, or fingers.    SEEK IMMEDIATE MEDICAL CARE IF:  Your arm, hand, or fingers:  Tingle.  Become numb.  Become swollen.  Become painful.  Turn white or blue.    ADDITIONAL NOTES AND INSTRUCTIONS    Please follow up with your Primary MD in 24-48 hr.  Seek immediate medical care for any new/worsening signs or symptoms. Orthopedics/pain management follow-up: Our Emergency Department Referral Coordinators will be reaching out to you in the next 24-48 hours from 9:00am to 5:00pm with a follow up appointment. Please expect a phone call from the hospital in that time frame. If you do not receive a call or if you have any questions or concerns, you can reach them at [phone]    A referral for an orthopedic surgeon is included if you would like to get a head start on getting something scheduled.       Shoulder Pain    Many things can cause shoulder pain, including:    An injury to the area.  Overuse of the shoulder.  Arthritis.    The source of the pain can be:    Inflammation.  An injury to the shoulder joint.  An injury to a tendon, ligament, or bone.    HOME CARE INSTRUCTIONS  Take these actions to help with your pain:     Squeeze a soft ball or a foam pad as much as possible. This helps to keep the shoulder from swelling. It also helps to strengthen the arm.  Take over-the-counter and prescription medicines only as told by your health care provider.  If directed, apply ice to the area:  Put ice in a plastic bag.  Place a towel between your skin and the bag.  Leave the ice on for 20 minutes, 2–3 times per day. Stop applying ice if it does not help with the pain.  If you were given a shoulder sling or immobilizer:  Wear it as told.  Remove it to shower or bathe.  Move your arm as little as possible, but keep your hand moving to prevent swelling.    SEEK MEDICAL CARE IF:  Your pain gets worse.  Your pain is not relieved with medicines.  New pain develops in your arm, hand, or fingers.    SEEK IMMEDIATE MEDICAL CARE IF:  Your arm, hand, or fingers:  Tingle.  Become numb.  Become swollen.  Become painful.  Turn white or blue.    ADDITIONAL NOTES AND INSTRUCTIONS    Please follow up with your Primary MD in 24-48 hr.  Seek immediate medical care for any new/worsening signs or symptoms.

## 2023-12-09 NOTE — ED PROVIDER NOTE - PROGRESS NOTE DETAILS
LUZ: whitney hyman with outpatient management; discussed risks vs benefits of admission and inpatient rehab with colin, would prefer outpatient.

## 2023-12-09 NOTE — ED PROVIDER NOTE - ATTENDING CONTRIBUTION TO CARE
83-year-old female past medical history of HLD, DM, HTN, arthritis presenting with her daughter for evaluation of nontraumatic left shoulder pain for the past several weeks.  Pain is worse with movement of the joint, no associated weakness, paresthesias, skin changes, chest pain shortness of breath or any other additional complaints. Patient was reportedly advised to take Aleve, it is not helping much. Elderly female in no acute distress, limited range of motion of the shoulder due to pain, no overlying skin changes, no tenderness along the clavicle, nontender elbow/wrist/hand, skin normal color temperature, normal sensation, distal pulses intact.  Impression/plan: Acute exacerbation of chronic pain, Tylenol, x-ray, anticipate discharge with outpatient Ortho and pain management follow-up.  Patient and her daughter are amenable to plan.

## 2023-12-09 NOTE — ED PROVIDER NOTE - CLINICAL SUMMARY MEDICAL DECISION MAKING FREE TEXT BOX
83-year-old female with nontraumatic left shoulder pain for several weeks.  Taking Aleve without much relief.  Vital signs were reviewed.  Patient was given dose of Tylenol, fell asleep, reported feeling better, shoulder x-ray independently interpreted by me. Patient is stable for discharge home with outpatient follow-up with orthopedics.  She was entered into the referral service to help her make an appointment with Ortho and pain management.  Advised to take Tylenol in addition to Aleve for better pain control.  Strict return precautions given, patient's daughter verbalized understanding and is amenable with the plan.

## 2023-12-09 NOTE — ED ADULT NURSE NOTE - NSFALLHARMRISKINTERV_ED_ALL_ED
Assistance OOB with selected safe patient handling equipment if applicable/Assistance with ambulation/Communicate risk of Fall with Harm to all staff, patient, and family/Monitor gait and stability/Provide patient with walking aids/Provide visual cue: red socks, yellow wristband, yellow gown, etc/Reinforce activity limits and safety measures with patient and family/Bed in lowest position, wheels locked, appropriate side rails in place/Call bell, personal items and telephone in reach/Instruct patient to call for assistance before getting out of bed/chair/stretcher/Non-slip footwear applied when patient is off stretcher/[place] to call system/Physically safe environment - no spills, clutter or unnecessary equipment/Purposeful Proactive Rounding/Room/bathroom lighting operational, light cord in reach Assistance OOB with selected safe patient handling equipment if applicable/Assistance with ambulation/Communicate risk of Fall with Harm to all staff, patient, and family/Monitor gait and stability/Provide patient with walking aids/Provide visual cue: red socks, yellow wristband, yellow gown, etc/Reinforce activity limits and safety measures with patient and family/Bed in lowest position, wheels locked, appropriate side rails in place/Call bell, personal items and telephone in reach/Instruct patient to call for assistance before getting out of bed/chair/stretcher/Non-slip footwear applied when patient is off stretcher/[place] to call system/Physically safe environment - no spills, clutter or unnecessary equipment/Purposeful Proactive Rounding/Room/bathroom lighting operational, light cord in reach

## 2023-12-10 PROBLEM — I10 ESSENTIAL (PRIMARY) HYPERTENSION: Chronic | Status: ACTIVE | Noted: 2021-09-10

## 2023-12-10 PROBLEM — E11.9 TYPE 2 DIABETES MELLITUS WITHOUT COMPLICATIONS: Chronic | Status: ACTIVE | Noted: 2021-09-10

## 2023-12-10 PROBLEM — E78.5 HYPERLIPIDEMIA, UNSPECIFIED: Chronic | Status: ACTIVE | Noted: 2021-09-10

## 2023-12-10 PROBLEM — M10.9 GOUT, UNSPECIFIED: Chronic | Status: ACTIVE | Noted: 2021-09-10

## 2023-12-11 ENCOUNTER — APPOINTMENT (OUTPATIENT)
Dept: ORTHOPEDIC SURGERY | Facility: CLINIC | Age: 83
End: 2023-12-11
Payer: COMMERCIAL

## 2023-12-11 VITALS — WEIGHT: 175 LBS | BODY MASS INDEX: 34.36 KG/M2 | HEIGHT: 60 IN

## 2023-12-11 DIAGNOSIS — Z86.39 PERSONAL HISTORY OF OTHER ENDOCRINE, NUTRITIONAL AND METABOLIC DISEASE: ICD-10-CM

## 2023-12-11 DIAGNOSIS — M75.02 ADHESIVE CAPSULITIS OF LEFT SHOULDER: ICD-10-CM

## 2023-12-11 PROCEDURE — 99203 OFFICE O/P NEW LOW 30 MIN: CPT

## 2023-12-11 RX ORDER — PREGABALIN 300 MG/1
CAPSULE ORAL
Refills: 0 | Status: ACTIVE | COMMUNITY

## 2023-12-11 RX ORDER — ENALAPRIL MALEATE 5 MG/1
TABLET ORAL
Refills: 0 | Status: ACTIVE | COMMUNITY

## 2023-12-11 RX ORDER — TRAMADOL HYDROCHLORIDE 50 MG/1
50 TABLET, COATED ORAL
Qty: 20 | Refills: 0 | Status: ACTIVE | COMMUNITY
Start: 2023-12-11 | End: 1900-01-01

## 2023-12-11 RX ORDER — METFORMIN HYDROCHLORIDE 625 MG/1
TABLET ORAL
Refills: 0 | Status: ACTIVE | COMMUNITY

## 2023-12-11 RX ORDER — INSULIN ASPART 100 [IU]/ML
INJECTION, SOLUTION INTRAVENOUS; SUBCUTANEOUS
Refills: 0 | Status: ACTIVE | COMMUNITY

## 2023-12-11 RX ORDER — SEMAGLUTIDE 1.34 MG/ML
INJECTION, SOLUTION SUBCUTANEOUS
Refills: 0 | Status: ACTIVE | COMMUNITY

## 2023-12-11 RX ORDER — NABUMETONE 750 MG/1
750 TABLET, FILM COATED ORAL
Qty: 60 | Refills: 1 | Status: ACTIVE | COMMUNITY
Start: 2023-12-11 | End: 1900-01-01

## 2023-12-11 RX ORDER — ATORVASTATIN CALCIUM 80 MG/1
TABLET, FILM COATED ORAL
Refills: 0 | Status: ACTIVE | COMMUNITY

## 2023-12-11 RX ORDER — DESVENLAFAXINE SUCCINATE 25 MG/1
TABLET, EXTENDED RELEASE ORAL
Refills: 0 | Status: ACTIVE | COMMUNITY

## 2023-12-20 NOTE — CHART NOTE - NSCHARTNOTEFT_GEN_A_CORE
added to pain mgmt 12/11, MV (as per Pain Mngmt- I ran into this patient a couple times when I was working with Dr. Olson; patient kept cancelling appointment with Dr. Olson 12/11). No further Pt contact 12/20-

## 2023-12-26 NOTE — CHART NOTE - NSCHARTNOTEFT_GEN_A_CORE
[facility] MRN [id number] - added to pain mgmt chat 12/11, MV/ inquiry sent to Pain mngmt 12/20- AC / Per pain management, she f/u with orthopedic for her pain 12/26 -     Specialty: pain management [facility] MRN [id number] - added to pain mgmt chat 12/11, MV/ inquiry sent to Pain mngmt 12/20- AC / Per pain management, she f/u with orthopedic for her pain 12/26 -     Specialty: pain management

## 2023-12-28 ENCOUNTER — INPATIENT (INPATIENT)
Facility: HOSPITAL | Age: 83
LOS: 1 days | DRG: 208 | End: 2023-12-30
Attending: INTERNAL MEDICINE | Admitting: INTERNAL MEDICINE
Payer: COMMERCIAL

## 2023-12-28 VITALS — RESPIRATION RATE: 18 BRPM | OXYGEN SATURATION: 98 % | HEART RATE: 89 BPM

## 2023-12-28 DIAGNOSIS — Z96.653 PRESENCE OF ARTIFICIAL KNEE JOINT, BILATERAL: Chronic | ICD-10-CM

## 2023-12-28 LAB
ALBUMIN SERPL ELPH-MCNC: 3.8 G/DL — SIGNIFICANT CHANGE UP (ref 3.5–5.2)
ALBUMIN SERPL ELPH-MCNC: 3.8 G/DL — SIGNIFICANT CHANGE UP (ref 3.5–5.2)
ALP SERPL-CCNC: 96 U/L — SIGNIFICANT CHANGE UP (ref 30–115)
ALP SERPL-CCNC: 96 U/L — SIGNIFICANT CHANGE UP (ref 30–115)
ALT FLD-CCNC: 65 U/L — HIGH (ref 0–41)
ALT FLD-CCNC: 65 U/L — HIGH (ref 0–41)
ANION GAP SERPL CALC-SCNC: 17 MMOL/L — HIGH (ref 7–14)
ANION GAP SERPL CALC-SCNC: 17 MMOL/L — HIGH (ref 7–14)
APTT BLD: 40.7 SEC — HIGH (ref 27–39.2)
APTT BLD: 40.7 SEC — HIGH (ref 27–39.2)
AST SERPL-CCNC: 121 U/L — HIGH (ref 0–41)
AST SERPL-CCNC: 121 U/L — HIGH (ref 0–41)
BASE EXCESS BLDV CALC-SCNC: -10.2 MMOL/L — LOW (ref -2–3)
BASE EXCESS BLDV CALC-SCNC: -10.2 MMOL/L — LOW (ref -2–3)
BASE EXCESS BLDV CALC-SCNC: -8.2 MMOL/L — LOW (ref -2–3)
BASE EXCESS BLDV CALC-SCNC: -8.2 MMOL/L — LOW (ref -2–3)
BASOPHILS # BLD AUTO: 0.25 K/UL — HIGH (ref 0–0.2)
BASOPHILS # BLD AUTO: 0.25 K/UL — HIGH (ref 0–0.2)
BASOPHILS NFR BLD AUTO: 0.8 % — SIGNIFICANT CHANGE UP (ref 0–1)
BASOPHILS NFR BLD AUTO: 0.8 % — SIGNIFICANT CHANGE UP (ref 0–1)
BILIRUB SERPL-MCNC: 0.4 MG/DL — SIGNIFICANT CHANGE UP (ref 0.2–1.2)
BILIRUB SERPL-MCNC: 0.4 MG/DL — SIGNIFICANT CHANGE UP (ref 0.2–1.2)
BUN SERPL-MCNC: 24 MG/DL — HIGH (ref 10–20)
BUN SERPL-MCNC: 24 MG/DL — HIGH (ref 10–20)
CA-I SERPL-SCNC: 1.15 MMOL/L — SIGNIFICANT CHANGE UP (ref 1.15–1.33)
CA-I SERPL-SCNC: 1.15 MMOL/L — SIGNIFICANT CHANGE UP (ref 1.15–1.33)
CA-I SERPL-SCNC: 1.21 MMOL/L — SIGNIFICANT CHANGE UP (ref 1.15–1.33)
CA-I SERPL-SCNC: 1.21 MMOL/L — SIGNIFICANT CHANGE UP (ref 1.15–1.33)
CALCIUM SERPL-MCNC: 9.5 MG/DL — SIGNIFICANT CHANGE UP (ref 8.4–10.4)
CALCIUM SERPL-MCNC: 9.5 MG/DL — SIGNIFICANT CHANGE UP (ref 8.4–10.4)
CHLORIDE SERPL-SCNC: 98 MMOL/L — SIGNIFICANT CHANGE UP (ref 98–110)
CHLORIDE SERPL-SCNC: 98 MMOL/L — SIGNIFICANT CHANGE UP (ref 98–110)
CO2 SERPL-SCNC: 20 MMOL/L — SIGNIFICANT CHANGE UP (ref 17–32)
CO2 SERPL-SCNC: 20 MMOL/L — SIGNIFICANT CHANGE UP (ref 17–32)
CREAT SERPL-MCNC: 1.1 MG/DL — SIGNIFICANT CHANGE UP (ref 0.7–1.5)
CREAT SERPL-MCNC: 1.1 MG/DL — SIGNIFICANT CHANGE UP (ref 0.7–1.5)
EGFR: 50 ML/MIN/1.73M2 — LOW
EGFR: 50 ML/MIN/1.73M2 — LOW
EOSINOPHIL # BLD AUTO: 0.25 K/UL — SIGNIFICANT CHANGE UP (ref 0–0.7)
EOSINOPHIL # BLD AUTO: 0.25 K/UL — SIGNIFICANT CHANGE UP (ref 0–0.7)
EOSINOPHIL NFR BLD AUTO: 0.8 % — SIGNIFICANT CHANGE UP (ref 0–8)
EOSINOPHIL NFR BLD AUTO: 0.8 % — SIGNIFICANT CHANGE UP (ref 0–8)
GAS PNL BLDV: 127 MMOL/L — LOW (ref 136–145)
GAS PNL BLDV: 127 MMOL/L — LOW (ref 136–145)
GAS PNL BLDV: 131 MMOL/L — LOW (ref 136–145)
GAS PNL BLDV: 131 MMOL/L — LOW (ref 136–145)
GAS PNL BLDV: SIGNIFICANT CHANGE UP
GLUCOSE SERPL-MCNC: 221 MG/DL — HIGH (ref 70–99)
GLUCOSE SERPL-MCNC: 221 MG/DL — HIGH (ref 70–99)
HCO3 BLDV-SCNC: 19 MMOL/L — LOW (ref 22–29)
HCO3 BLDV-SCNC: 19 MMOL/L — LOW (ref 22–29)
HCO3 BLDV-SCNC: 23 MMOL/L — SIGNIFICANT CHANGE UP (ref 22–29)
HCO3 BLDV-SCNC: 23 MMOL/L — SIGNIFICANT CHANGE UP (ref 22–29)
HCT VFR BLD CALC: 48 % — HIGH (ref 37–47)
HCT VFR BLD CALC: 48 % — HIGH (ref 37–47)
HCT VFR BLDA CALC: 44 % — SIGNIFICANT CHANGE UP (ref 34.5–46.5)
HCT VFR BLDA CALC: 44 % — SIGNIFICANT CHANGE UP (ref 34.5–46.5)
HGB BLD CALC-MCNC: 14.7 G/DL — SIGNIFICANT CHANGE UP (ref 11.7–16.1)
HGB BLD CALC-MCNC: 14.7 G/DL — SIGNIFICANT CHANGE UP (ref 11.7–16.1)
HGB BLD-MCNC: 15.4 G/DL — SIGNIFICANT CHANGE UP (ref 12–16)
HGB BLD-MCNC: 15.4 G/DL — SIGNIFICANT CHANGE UP (ref 12–16)
IMM GRANULOCYTES NFR BLD AUTO: 2.2 % — HIGH (ref 0.1–0.3)
IMM GRANULOCYTES NFR BLD AUTO: 2.2 % — HIGH (ref 0.1–0.3)
INR BLD: 1.09 RATIO — SIGNIFICANT CHANGE UP (ref 0.65–1.3)
INR BLD: 1.09 RATIO — SIGNIFICANT CHANGE UP (ref 0.65–1.3)
LACTATE BLDV-MCNC: 6.9 MMOL/L — CRITICAL HIGH (ref 0.5–2)
LACTATE BLDV-MCNC: 6.9 MMOL/L — CRITICAL HIGH (ref 0.5–2)
LACTATE BLDV-MCNC: 7.4 MMOL/L — CRITICAL HIGH (ref 0.5–2)
LACTATE BLDV-MCNC: 7.4 MMOL/L — CRITICAL HIGH (ref 0.5–2)
LYMPHOCYTES # BLD AUTO: 18.6 % — LOW (ref 20.5–51.1)
LYMPHOCYTES # BLD AUTO: 18.6 % — LOW (ref 20.5–51.1)
LYMPHOCYTES # BLD AUTO: 5.53 K/UL — HIGH (ref 1.2–3.4)
LYMPHOCYTES # BLD AUTO: 5.53 K/UL — HIGH (ref 1.2–3.4)
MCHC RBC-ENTMCNC: 27.5 PG — SIGNIFICANT CHANGE UP (ref 27–31)
MCHC RBC-ENTMCNC: 27.5 PG — SIGNIFICANT CHANGE UP (ref 27–31)
MCHC RBC-ENTMCNC: 32.1 G/DL — SIGNIFICANT CHANGE UP (ref 32–37)
MCHC RBC-ENTMCNC: 32.1 G/DL — SIGNIFICANT CHANGE UP (ref 32–37)
MCV RBC AUTO: 85.6 FL — SIGNIFICANT CHANGE UP (ref 81–99)
MCV RBC AUTO: 85.6 FL — SIGNIFICANT CHANGE UP (ref 81–99)
MONOCYTES # BLD AUTO: 1.37 K/UL — HIGH (ref 0.1–0.6)
MONOCYTES # BLD AUTO: 1.37 K/UL — HIGH (ref 0.1–0.6)
MONOCYTES NFR BLD AUTO: 4.6 % — SIGNIFICANT CHANGE UP (ref 1.7–9.3)
MONOCYTES NFR BLD AUTO: 4.6 % — SIGNIFICANT CHANGE UP (ref 1.7–9.3)
NEUTROPHILS # BLD AUTO: 21.71 K/UL — HIGH (ref 1.4–6.5)
NEUTROPHILS # BLD AUTO: 21.71 K/UL — HIGH (ref 1.4–6.5)
NEUTROPHILS NFR BLD AUTO: 73 % — SIGNIFICANT CHANGE UP (ref 42.2–75.2)
NEUTROPHILS NFR BLD AUTO: 73 % — SIGNIFICANT CHANGE UP (ref 42.2–75.2)
NRBC # BLD: 0 /100 WBCS — SIGNIFICANT CHANGE UP (ref 0–0)
NRBC # BLD: 0 /100 WBCS — SIGNIFICANT CHANGE UP (ref 0–0)
PCO2 BLDV: 53 MMHG — HIGH (ref 39–42)
PCO2 BLDV: 53 MMHG — HIGH (ref 39–42)
PCO2 BLDV: 71 MMHG — HIGH (ref 39–42)
PCO2 BLDV: 71 MMHG — HIGH (ref 39–42)
PH BLDV: 7.11 — CRITICAL LOW (ref 7.32–7.43)
PH BLDV: 7.11 — CRITICAL LOW (ref 7.32–7.43)
PH BLDV: 7.16 — CRITICAL LOW (ref 7.32–7.43)
PH BLDV: 7.16 — CRITICAL LOW (ref 7.32–7.43)
PLATELET # BLD AUTO: 506 K/UL — HIGH (ref 130–400)
PLATELET # BLD AUTO: 506 K/UL — HIGH (ref 130–400)
PMV BLD: 11.3 FL — HIGH (ref 7.4–10.4)
PMV BLD: 11.3 FL — HIGH (ref 7.4–10.4)
PO2 BLDV: 38 MMHG — SIGNIFICANT CHANGE UP (ref 25–45)
PO2 BLDV: 38 MMHG — SIGNIFICANT CHANGE UP (ref 25–45)
PO2 BLDV: 39 MMHG — SIGNIFICANT CHANGE UP (ref 25–45)
PO2 BLDV: 39 MMHG — SIGNIFICANT CHANGE UP (ref 25–45)
POTASSIUM BLDV-SCNC: 5.6 MMOL/L — HIGH (ref 3.5–5.1)
POTASSIUM BLDV-SCNC: 5.6 MMOL/L — HIGH (ref 3.5–5.1)
POTASSIUM BLDV-SCNC: 7.3 MMOL/L — CRITICAL HIGH (ref 3.5–5.1)
POTASSIUM BLDV-SCNC: 7.3 MMOL/L — CRITICAL HIGH (ref 3.5–5.1)
POTASSIUM SERPL-MCNC: 5.9 MMOL/L — HIGH (ref 3.5–5)
POTASSIUM SERPL-MCNC: 5.9 MMOL/L — HIGH (ref 3.5–5)
POTASSIUM SERPL-SCNC: 5.9 MMOL/L — HIGH (ref 3.5–5)
POTASSIUM SERPL-SCNC: 5.9 MMOL/L — HIGH (ref 3.5–5)
PROT SERPL-MCNC: 7.3 G/DL — SIGNIFICANT CHANGE UP (ref 6–8)
PROT SERPL-MCNC: 7.3 G/DL — SIGNIFICANT CHANGE UP (ref 6–8)
PROTHROM AB SERPL-ACNC: 12.4 SEC — SIGNIFICANT CHANGE UP (ref 9.95–12.87)
PROTHROM AB SERPL-ACNC: 12.4 SEC — SIGNIFICANT CHANGE UP (ref 9.95–12.87)
RBC # BLD: 5.61 M/UL — HIGH (ref 4.2–5.4)
RBC # BLD: 5.61 M/UL — HIGH (ref 4.2–5.4)
RBC # FLD: 15 % — HIGH (ref 11.5–14.5)
RBC # FLD: 15 % — HIGH (ref 11.5–14.5)
SAO2 % BLDV: 46.1 % — LOW (ref 67–88)
SAO2 % BLDV: 46.1 % — LOW (ref 67–88)
SAO2 % BLDV: 49.4 % — LOW (ref 67–88)
SAO2 % BLDV: 49.4 % — LOW (ref 67–88)
SODIUM SERPL-SCNC: 135 MMOL/L — SIGNIFICANT CHANGE UP (ref 135–146)
SODIUM SERPL-SCNC: 135 MMOL/L — SIGNIFICANT CHANGE UP (ref 135–146)
TROPONIN T, HIGH SENSITIVITY RESULT: 41 NG/L — HIGH (ref 6–13)
TROPONIN T, HIGH SENSITIVITY RESULT: 41 NG/L — HIGH (ref 6–13)
WBC # BLD: 29.76 K/UL — HIGH (ref 4.8–10.8)
WBC # BLD: 29.76 K/UL — HIGH (ref 4.8–10.8)
WBC # FLD AUTO: 29.76 K/UL — HIGH (ref 4.8–10.8)
WBC # FLD AUTO: 29.76 K/UL — HIGH (ref 4.8–10.8)

## 2023-12-28 PROCEDURE — 99291 CRITICAL CARE FIRST HOUR: CPT | Mod: 25

## 2023-12-28 PROCEDURE — 71045 X-RAY EXAM CHEST 1 VIEW: CPT | Mod: 26

## 2023-12-28 PROCEDURE — 36556 INSERT NON-TUNNEL CV CATH: CPT | Mod: RT

## 2023-12-28 PROCEDURE — 99053 MED SERV 10PM-8AM 24 HR FAC: CPT

## 2023-12-28 PROCEDURE — 51702 INSERT TEMP BLADDER CATH: CPT | Mod: 59

## 2023-12-28 PROCEDURE — 93308 TTE F-UP OR LMTD: CPT | Mod: 26

## 2023-12-28 RX ORDER — FENTANYL CITRATE 50 UG/ML
100 INJECTION INTRAVENOUS ONCE
Refills: 0 | Status: DISCONTINUED | OUTPATIENT
Start: 2023-12-28 | End: 2023-12-28

## 2023-12-28 RX ORDER — PROPOFOL 10 MG/ML
20 INJECTION, EMULSION INTRAVENOUS
Qty: 500 | Refills: 0 | Status: DISCONTINUED | OUTPATIENT
Start: 2023-12-28 | End: 2023-12-30

## 2023-12-28 RX ORDER — CHLORHEXIDINE GLUCONATE 213 G/1000ML
15 SOLUTION TOPICAL EVERY 12 HOURS
Refills: 0 | Status: DISCONTINUED | OUTPATIENT
Start: 2023-12-28 | End: 2023-12-30

## 2023-12-28 RX ADMIN — PROPOFOL 10.2 MICROGRAM(S)/KG/MIN: 10 INJECTION, EMULSION INTRAVENOUS at 21:25

## 2023-12-28 RX ADMIN — FENTANYL CITRATE 100 MICROGRAM(S): 50 INJECTION INTRAVENOUS at 21:34

## 2023-12-28 RX ADMIN — ALBUTEROL 10 MILLIGRAM(S): 90 AEROSOL, METERED ORAL at 23:59

## 2023-12-28 NOTE — ED ADULT NURSE NOTE - NSFALLHARMRISKINTERV_ED_ALL_ED
Communicate risk of Fall with Harm to all staff, patient, and family/Provide visual cue: red socks, yellow wristband, yellow gown, etc/Reinforce activity limits and safety measures with patient and family/Bed in lowest position, wheels locked, appropriate side rails in place/Call bell, personal items and telephone in reach/Instruct patient to call for assistance before getting out of bed/chair/stretcher/Non-slip footwear applied when patient is off stretcher/[place] to call system/Physically safe environment - no spills, clutter or unnecessary equipment/Purposeful Proactive Rounding/Room/bathroom lighting operational, light cord in reach Communicate risk of Fall with Harm to all staff, patient, and family/Provide visual cue: red socks, yellow wristband, yellow gown, etc/Reinforce activity limits and safety measures with patient and family/Bed in lowest position, wheels locked, appropriate side rails in place/Call bell, personal items and telephone in reach/Instruct patient to call for assistance before getting out of bed/chair/stretcher/Non-slip footwear applied when patient is off stretcher/[place] to call system/Physically safe environment - no spills, clutter or unnecessary equipment/Purposeful Proactive Rounding/Room/bathroom lighting operational, light cord in reach

## 2023-12-28 NOTE — ED ADULT NURSE NOTE - NS ED NURSE REPORT GIVEN DT
Message  Received: Today  Message Contents   FAZAL Vang RN             No, Vitamin D infusion.   PO ergacalciferol and cholecalciferol   ThanksJameel    Previous Messages    ----- Message -----   From: Nai Corona RN   Sent: 2/4/2021   8:26 AM CST   To: Jameel Guevara RPH     Is there a Vit D infusion?     Thank you,   THAIS Stewart with Dr. Catalan and Team            29-Dec-2023 07:41

## 2023-12-28 NOTE — ED ADULT NURSE NOTE - CHIEF COMPLAINT QUOTE
Pt. BIBA from home unresponsive. As per family pt. became unconscious after choking. CPR was started by family. Intubated in field

## 2023-12-29 DIAGNOSIS — I46.9 CARDIAC ARREST, CAUSE UNSPECIFIED: ICD-10-CM

## 2023-12-29 LAB
ALBUMIN SERPL ELPH-MCNC: 3.2 G/DL — LOW (ref 3.5–5.2)
ALBUMIN SERPL ELPH-MCNC: 3.2 G/DL — LOW (ref 3.5–5.2)
ALBUMIN SERPL ELPH-MCNC: 3.3 G/DL — LOW (ref 3.5–5.2)
ALBUMIN SERPL ELPH-MCNC: 3.3 G/DL — LOW (ref 3.5–5.2)
ALP SERPL-CCNC: 103 U/L — SIGNIFICANT CHANGE UP (ref 30–115)
ALP SERPL-CCNC: 103 U/L — SIGNIFICANT CHANGE UP (ref 30–115)
ALP SERPL-CCNC: 81 U/L — SIGNIFICANT CHANGE UP (ref 30–115)
ALP SERPL-CCNC: 81 U/L — SIGNIFICANT CHANGE UP (ref 30–115)
ALT FLD-CCNC: 453 U/L — HIGH (ref 0–41)
ALT FLD-CCNC: 453 U/L — HIGH (ref 0–41)
ALT FLD-CCNC: 71 U/L — HIGH (ref 0–41)
ALT FLD-CCNC: 71 U/L — HIGH (ref 0–41)
ANION GAP SERPL CALC-SCNC: 17 MMOL/L — HIGH (ref 7–14)
ANION GAP SERPL CALC-SCNC: 17 MMOL/L — HIGH (ref 7–14)
ANION GAP SERPL CALC-SCNC: 19 MMOL/L — HIGH (ref 7–14)
ANION GAP SERPL CALC-SCNC: 19 MMOL/L — HIGH (ref 7–14)
ANION GAP SERPL CALC-SCNC: 25 MMOL/L — HIGH (ref 7–14)
ANION GAP SERPL CALC-SCNC: 25 MMOL/L — HIGH (ref 7–14)
ANION GAP SERPL CALC-SCNC: 29 MMOL/L — HIGH (ref 7–14)
ANION GAP SERPL CALC-SCNC: 29 MMOL/L — HIGH (ref 7–14)
ANION GAP SERPL CALC-SCNC: 32 MMOL/L — HIGH (ref 7–14)
ANION GAP SERPL CALC-SCNC: 32 MMOL/L — HIGH (ref 7–14)
APPEARANCE UR: CLEAR — SIGNIFICANT CHANGE UP
APPEARANCE UR: CLEAR — SIGNIFICANT CHANGE UP
AST SERPL-CCNC: 203 U/L — HIGH (ref 0–41)
AST SERPL-CCNC: 203 U/L — HIGH (ref 0–41)
AST SERPL-CCNC: 774 U/L — HIGH (ref 0–41)
AST SERPL-CCNC: 774 U/L — HIGH (ref 0–41)
BACTERIA # UR AUTO: NEGATIVE /HPF — SIGNIFICANT CHANGE UP
BACTERIA # UR AUTO: NEGATIVE /HPF — SIGNIFICANT CHANGE UP
BASE EXCESS BLDV CALC-SCNC: -8.1 MMOL/L — LOW (ref -2–3)
BASE EXCESS BLDV CALC-SCNC: -8.1 MMOL/L — LOW (ref -2–3)
BASOPHILS # BLD AUTO: 0.19 K/UL — SIGNIFICANT CHANGE UP (ref 0–0.2)
BASOPHILS # BLD AUTO: 0.19 K/UL — SIGNIFICANT CHANGE UP (ref 0–0.2)
BASOPHILS NFR BLD AUTO: 0.6 % — SIGNIFICANT CHANGE UP (ref 0–1)
BASOPHILS NFR BLD AUTO: 0.6 % — SIGNIFICANT CHANGE UP (ref 0–1)
BILIRUB SERPL-MCNC: 0.7 MG/DL — SIGNIFICANT CHANGE UP (ref 0.2–1.2)
BILIRUB UR-MCNC: NEGATIVE — SIGNIFICANT CHANGE UP
BILIRUB UR-MCNC: NEGATIVE — SIGNIFICANT CHANGE UP
BUN SERPL-MCNC: 27 MG/DL — HIGH (ref 10–20)
BUN SERPL-MCNC: 27 MG/DL — HIGH (ref 10–20)
BUN SERPL-MCNC: 29 MG/DL — HIGH (ref 10–20)
BUN SERPL-MCNC: 29 MG/DL — HIGH (ref 10–20)
BUN SERPL-MCNC: 39 MG/DL — HIGH (ref 10–20)
BUN SERPL-MCNC: 39 MG/DL — HIGH (ref 10–20)
BUN SERPL-MCNC: 40 MG/DL — HIGH (ref 10–20)
BUN SERPL-MCNC: 40 MG/DL — HIGH (ref 10–20)
BUN SERPL-MCNC: 42 MG/DL — HIGH (ref 10–20)
BUN SERPL-MCNC: 42 MG/DL — HIGH (ref 10–20)
CA-I SERPL-SCNC: 1.15 MMOL/L — SIGNIFICANT CHANGE UP (ref 1.15–1.33)
CA-I SERPL-SCNC: 1.15 MMOL/L — SIGNIFICANT CHANGE UP (ref 1.15–1.33)
CALCIUM SERPL-MCNC: 6 MG/DL — LOW (ref 8.4–10.5)
CALCIUM SERPL-MCNC: 6 MG/DL — LOW (ref 8.4–10.5)
CALCIUM SERPL-MCNC: 8.6 MG/DL — SIGNIFICANT CHANGE UP (ref 8.4–10.4)
CALCIUM SERPL-MCNC: 8.6 MG/DL — SIGNIFICANT CHANGE UP (ref 8.4–10.4)
CALCIUM SERPL-MCNC: 8.9 MG/DL — SIGNIFICANT CHANGE UP (ref 8.4–10.5)
CALCIUM SERPL-MCNC: 8.9 MG/DL — SIGNIFICANT CHANGE UP (ref 8.4–10.5)
CALCIUM SERPL-MCNC: 9.2 MG/DL — SIGNIFICANT CHANGE UP (ref 8.4–10.5)
CAST: 3 /LPF — SIGNIFICANT CHANGE UP (ref 0–4)
CAST: 3 /LPF — SIGNIFICANT CHANGE UP (ref 0–4)
CHLORIDE SERPL-SCNC: 109 MMOL/L — SIGNIFICANT CHANGE UP (ref 98–110)
CHLORIDE SERPL-SCNC: 109 MMOL/L — SIGNIFICANT CHANGE UP (ref 98–110)
CHLORIDE SERPL-SCNC: 90 MMOL/L — LOW (ref 98–110)
CHLORIDE SERPL-SCNC: 90 MMOL/L — LOW (ref 98–110)
CHLORIDE SERPL-SCNC: 91 MMOL/L — LOW (ref 98–110)
CHLORIDE SERPL-SCNC: 91 MMOL/L — LOW (ref 98–110)
CHLORIDE SERPL-SCNC: 92 MMOL/L — LOW (ref 98–110)
CHLORIDE SERPL-SCNC: 92 MMOL/L — LOW (ref 98–110)
CHLORIDE SERPL-SCNC: 97 MMOL/L — LOW (ref 98–110)
CHLORIDE SERPL-SCNC: 97 MMOL/L — LOW (ref 98–110)
CO2 SERPL-SCNC: 12 MMOL/L — LOW (ref 17–32)
CO2 SERPL-SCNC: 12 MMOL/L — LOW (ref 17–32)
CO2 SERPL-SCNC: 13 MMOL/L — LOW (ref 17–32)
CO2 SERPL-SCNC: 13 MMOL/L — LOW (ref 17–32)
CO2 SERPL-SCNC: 15 MMOL/L — LOW (ref 17–32)
CO2 SERPL-SCNC: 15 MMOL/L — LOW (ref 17–32)
CO2 SERPL-SCNC: 17 MMOL/L — SIGNIFICANT CHANGE UP (ref 17–32)
CO2 SERPL-SCNC: 17 MMOL/L — SIGNIFICANT CHANGE UP (ref 17–32)
CO2 SERPL-SCNC: 18 MMOL/L — SIGNIFICANT CHANGE UP (ref 17–32)
CO2 SERPL-SCNC: 18 MMOL/L — SIGNIFICANT CHANGE UP (ref 17–32)
COLOR SPEC: YELLOW — SIGNIFICANT CHANGE UP
COLOR SPEC: YELLOW — SIGNIFICANT CHANGE UP
CREAT SERPL-MCNC: 1.2 MG/DL — SIGNIFICANT CHANGE UP (ref 0.7–1.5)
CREAT SERPL-MCNC: 1.2 MG/DL — SIGNIFICANT CHANGE UP (ref 0.7–1.5)
CREAT SERPL-MCNC: 1.5 MG/DL — SIGNIFICANT CHANGE UP (ref 0.7–1.5)
CREAT SERPL-MCNC: 1.5 MG/DL — SIGNIFICANT CHANGE UP (ref 0.7–1.5)
CREAT SERPL-MCNC: 1.9 MG/DL — HIGH (ref 0.7–1.5)
CREAT SERPL-MCNC: 1.9 MG/DL — HIGH (ref 0.7–1.5)
CREAT SERPL-MCNC: 2.3 MG/DL — HIGH (ref 0.7–1.5)
CREAT SERPL-MCNC: 2.3 MG/DL — HIGH (ref 0.7–1.5)
CREAT SERPL-MCNC: 2.6 MG/DL — HIGH (ref 0.7–1.5)
CREAT SERPL-MCNC: 2.6 MG/DL — HIGH (ref 0.7–1.5)
DIFF PNL FLD: NEGATIVE — SIGNIFICANT CHANGE UP
DIFF PNL FLD: NEGATIVE — SIGNIFICANT CHANGE UP
EGFR: 18 ML/MIN/1.73M2 — LOW
EGFR: 18 ML/MIN/1.73M2 — LOW
EGFR: 21 ML/MIN/1.73M2 — LOW
EGFR: 21 ML/MIN/1.73M2 — LOW
EGFR: 26 ML/MIN/1.73M2 — LOW
EGFR: 26 ML/MIN/1.73M2 — LOW
EGFR: 35 ML/MIN/1.73M2 — LOW
EGFR: 35 ML/MIN/1.73M2 — LOW
EGFR: 45 ML/MIN/1.73M2 — LOW
EGFR: 45 ML/MIN/1.73M2 — LOW
EOSINOPHIL # BLD AUTO: 0.14 K/UL — SIGNIFICANT CHANGE UP (ref 0–0.7)
EOSINOPHIL # BLD AUTO: 0.14 K/UL — SIGNIFICANT CHANGE UP (ref 0–0.7)
EOSINOPHIL NFR BLD AUTO: 0.5 % — SIGNIFICANT CHANGE UP (ref 0–8)
EOSINOPHIL NFR BLD AUTO: 0.5 % — SIGNIFICANT CHANGE UP (ref 0–8)
GAS PNL BLDA: SIGNIFICANT CHANGE UP
GAS PNL BLDA: SIGNIFICANT CHANGE UP
GAS PNL BLDV: 132 MMOL/L — LOW (ref 136–145)
GAS PNL BLDV: 132 MMOL/L — LOW (ref 136–145)
GAS PNL BLDV: SIGNIFICANT CHANGE UP
GAS PNL BLDV: SIGNIFICANT CHANGE UP
GLUCOSE BLDC GLUCOMTR-MCNC: 166 MG/DL — HIGH (ref 70–99)
GLUCOSE BLDC GLUCOMTR-MCNC: 166 MG/DL — HIGH (ref 70–99)
GLUCOSE BLDC GLUCOMTR-MCNC: 228 MG/DL — HIGH (ref 70–99)
GLUCOSE BLDC GLUCOMTR-MCNC: 228 MG/DL — HIGH (ref 70–99)
GLUCOSE BLDC GLUCOMTR-MCNC: 265 MG/DL — HIGH (ref 70–99)
GLUCOSE BLDC GLUCOMTR-MCNC: 265 MG/DL — HIGH (ref 70–99)
GLUCOSE BLDC GLUCOMTR-MCNC: 279 MG/DL — HIGH (ref 70–99)
GLUCOSE BLDC GLUCOMTR-MCNC: 279 MG/DL — HIGH (ref 70–99)
GLUCOSE BLDC GLUCOMTR-MCNC: 280 MG/DL — HIGH (ref 70–99)
GLUCOSE BLDC GLUCOMTR-MCNC: 280 MG/DL — HIGH (ref 70–99)
GLUCOSE BLDC GLUCOMTR-MCNC: 334 MG/DL — HIGH (ref 70–99)
GLUCOSE BLDC GLUCOMTR-MCNC: 334 MG/DL — HIGH (ref 70–99)
GLUCOSE BLDC GLUCOMTR-MCNC: 342 MG/DL — HIGH (ref 70–99)
GLUCOSE BLDC GLUCOMTR-MCNC: 342 MG/DL — HIGH (ref 70–99)
GLUCOSE SERPL-MCNC: 236 MG/DL — HIGH (ref 70–99)
GLUCOSE SERPL-MCNC: 236 MG/DL — HIGH (ref 70–99)
GLUCOSE SERPL-MCNC: 237 MG/DL — HIGH (ref 70–99)
GLUCOSE SERPL-MCNC: 237 MG/DL — HIGH (ref 70–99)
GLUCOSE SERPL-MCNC: 252 MG/DL — HIGH (ref 70–99)
GLUCOSE SERPL-MCNC: 252 MG/DL — HIGH (ref 70–99)
GLUCOSE SERPL-MCNC: 313 MG/DL — HIGH (ref 70–99)
GLUCOSE SERPL-MCNC: 313 MG/DL — HIGH (ref 70–99)
GLUCOSE SERPL-MCNC: 325 MG/DL — HIGH (ref 70–99)
GLUCOSE SERPL-MCNC: 325 MG/DL — HIGH (ref 70–99)
GLUCOSE UR QL: NEGATIVE MG/DL — SIGNIFICANT CHANGE UP
GLUCOSE UR QL: NEGATIVE MG/DL — SIGNIFICANT CHANGE UP
HCO3 BLDV-SCNC: 19 MMOL/L — LOW (ref 22–29)
HCO3 BLDV-SCNC: 19 MMOL/L — LOW (ref 22–29)
HCT VFR BLD CALC: 41.1 % — SIGNIFICANT CHANGE UP (ref 37–47)
HCT VFR BLD CALC: 41.1 % — SIGNIFICANT CHANGE UP (ref 37–47)
HCT VFR BLD CALC: 44.7 % — SIGNIFICANT CHANGE UP (ref 37–47)
HCT VFR BLD CALC: 44.7 % — SIGNIFICANT CHANGE UP (ref 37–47)
HCT VFR BLDA CALC: 38 % — SIGNIFICANT CHANGE UP (ref 34.5–46.5)
HCT VFR BLDA CALC: 38 % — SIGNIFICANT CHANGE UP (ref 34.5–46.5)
HCV AB S/CO SERPL IA: 0.04 COI — SIGNIFICANT CHANGE UP
HCV AB S/CO SERPL IA: 0.04 COI — SIGNIFICANT CHANGE UP
HCV AB SERPL-IMP: SIGNIFICANT CHANGE UP
HCV AB SERPL-IMP: SIGNIFICANT CHANGE UP
HGB BLD CALC-MCNC: 12.5 G/DL — SIGNIFICANT CHANGE UP (ref 11.7–16.1)
HGB BLD CALC-MCNC: 12.5 G/DL — SIGNIFICANT CHANGE UP (ref 11.7–16.1)
HGB BLD-MCNC: 13.4 G/DL — SIGNIFICANT CHANGE UP (ref 12–16)
HGB BLD-MCNC: 13.4 G/DL — SIGNIFICANT CHANGE UP (ref 12–16)
HGB BLD-MCNC: 14.1 G/DL — SIGNIFICANT CHANGE UP (ref 12–16)
HGB BLD-MCNC: 14.1 G/DL — SIGNIFICANT CHANGE UP (ref 12–16)
IMM GRANULOCYTES NFR BLD AUTO: 1.7 % — HIGH (ref 0.1–0.3)
IMM GRANULOCYTES NFR BLD AUTO: 1.7 % — HIGH (ref 0.1–0.3)
KETONES UR-MCNC: NEGATIVE MG/DL — SIGNIFICANT CHANGE UP
KETONES UR-MCNC: NEGATIVE MG/DL — SIGNIFICANT CHANGE UP
LACTATE BLDV-MCNC: 9 MMOL/L — CRITICAL HIGH (ref 0.5–2)
LACTATE BLDV-MCNC: 9 MMOL/L — CRITICAL HIGH (ref 0.5–2)
LACTATE SERPL-SCNC: 7.1 MMOL/L — CRITICAL HIGH (ref 0.7–2)
LACTATE SERPL-SCNC: 7.1 MMOL/L — CRITICAL HIGH (ref 0.7–2)
LACTATE SERPL-SCNC: 9.7 MMOL/L — CRITICAL HIGH (ref 0.7–2)
LACTATE SERPL-SCNC: 9.7 MMOL/L — CRITICAL HIGH (ref 0.7–2)
LEUKOCYTE ESTERASE UR-ACNC: ABNORMAL
LEUKOCYTE ESTERASE UR-ACNC: ABNORMAL
LYMPHOCYTES # BLD AUTO: 16.4 % — LOW (ref 20.5–51.1)
LYMPHOCYTES # BLD AUTO: 16.4 % — LOW (ref 20.5–51.1)
LYMPHOCYTES # BLD AUTO: 4.8 K/UL — HIGH (ref 1.2–3.4)
LYMPHOCYTES # BLD AUTO: 4.8 K/UL — HIGH (ref 1.2–3.4)
MAGNESIUM SERPL-MCNC: 0.9 MG/DL — LOW (ref 1.8–2.4)
MAGNESIUM SERPL-MCNC: 0.9 MG/DL — LOW (ref 1.8–2.4)
MCHC RBC-ENTMCNC: 27.1 PG — SIGNIFICANT CHANGE UP (ref 27–31)
MCHC RBC-ENTMCNC: 27.1 PG — SIGNIFICANT CHANGE UP (ref 27–31)
MCHC RBC-ENTMCNC: 28 PG — SIGNIFICANT CHANGE UP (ref 27–31)
MCHC RBC-ENTMCNC: 28 PG — SIGNIFICANT CHANGE UP (ref 27–31)
MCHC RBC-ENTMCNC: 31.5 G/DL — LOW (ref 32–37)
MCHC RBC-ENTMCNC: 31.5 G/DL — LOW (ref 32–37)
MCHC RBC-ENTMCNC: 32.6 G/DL — SIGNIFICANT CHANGE UP (ref 32–37)
MCHC RBC-ENTMCNC: 32.6 G/DL — SIGNIFICANT CHANGE UP (ref 32–37)
MCV RBC AUTO: 85.8 FL — SIGNIFICANT CHANGE UP (ref 81–99)
MONOCYTES # BLD AUTO: 1.84 K/UL — HIGH (ref 0.1–0.6)
MONOCYTES # BLD AUTO: 1.84 K/UL — HIGH (ref 0.1–0.6)
MONOCYTES NFR BLD AUTO: 6.3 % — SIGNIFICANT CHANGE UP (ref 1.7–9.3)
MONOCYTES NFR BLD AUTO: 6.3 % — SIGNIFICANT CHANGE UP (ref 1.7–9.3)
MRSA PCR RESULT.: POSITIVE
MRSA PCR RESULT.: POSITIVE
NEUTROPHILS # BLD AUTO: 21.81 K/UL — HIGH (ref 1.4–6.5)
NEUTROPHILS # BLD AUTO: 21.81 K/UL — HIGH (ref 1.4–6.5)
NEUTROPHILS NFR BLD AUTO: 74.5 % — SIGNIFICANT CHANGE UP (ref 42.2–75.2)
NEUTROPHILS NFR BLD AUTO: 74.5 % — SIGNIFICANT CHANGE UP (ref 42.2–75.2)
NITRITE UR-MCNC: NEGATIVE — SIGNIFICANT CHANGE UP
NITRITE UR-MCNC: NEGATIVE — SIGNIFICANT CHANGE UP
NRBC # BLD: 0 /100 WBCS — SIGNIFICANT CHANGE UP (ref 0–0)
PCO2 BLDV: 46 MMHG — HIGH (ref 39–42)
PCO2 BLDV: 46 MMHG — HIGH (ref 39–42)
PH BLDV: 7.23 — LOW (ref 7.32–7.43)
PH BLDV: 7.23 — LOW (ref 7.32–7.43)
PH UR: 6.5 — SIGNIFICANT CHANGE UP (ref 5–8)
PH UR: 6.5 — SIGNIFICANT CHANGE UP (ref 5–8)
PLATELET # BLD AUTO: 334 K/UL — SIGNIFICANT CHANGE UP (ref 130–400)
PLATELET # BLD AUTO: 334 K/UL — SIGNIFICANT CHANGE UP (ref 130–400)
PLATELET # BLD AUTO: 498 K/UL — HIGH (ref 130–400)
PLATELET # BLD AUTO: 498 K/UL — HIGH (ref 130–400)
PMV BLD: 11.2 FL — HIGH (ref 7.4–10.4)
PMV BLD: 11.2 FL — HIGH (ref 7.4–10.4)
PMV BLD: 11.6 FL — HIGH (ref 7.4–10.4)
PMV BLD: 11.6 FL — HIGH (ref 7.4–10.4)
PO2 BLDV: 29 MMHG — SIGNIFICANT CHANGE UP (ref 25–45)
PO2 BLDV: 29 MMHG — SIGNIFICANT CHANGE UP (ref 25–45)
POTASSIUM BLDV-SCNC: 6.3 MMOL/L — CRITICAL HIGH (ref 3.5–5.1)
POTASSIUM BLDV-SCNC: 6.3 MMOL/L — CRITICAL HIGH (ref 3.5–5.1)
POTASSIUM SERPL-MCNC: 3.8 MMOL/L — SIGNIFICANT CHANGE UP (ref 3.5–5)
POTASSIUM SERPL-MCNC: 3.8 MMOL/L — SIGNIFICANT CHANGE UP (ref 3.5–5)
POTASSIUM SERPL-MCNC: 5.9 MMOL/L — HIGH (ref 3.5–5)
POTASSIUM SERPL-MCNC: 5.9 MMOL/L — HIGH (ref 3.5–5)
POTASSIUM SERPL-MCNC: 6.2 MMOL/L — CRITICAL HIGH (ref 3.5–5)
POTASSIUM SERPL-MCNC: 6.2 MMOL/L — CRITICAL HIGH (ref 3.5–5)
POTASSIUM SERPL-MCNC: 6.7 MMOL/L — CRITICAL HIGH (ref 3.5–5)
POTASSIUM SERPL-SCNC: 3.8 MMOL/L — SIGNIFICANT CHANGE UP (ref 3.5–5)
POTASSIUM SERPL-SCNC: 3.8 MMOL/L — SIGNIFICANT CHANGE UP (ref 3.5–5)
POTASSIUM SERPL-SCNC: 5.9 MMOL/L — HIGH (ref 3.5–5)
POTASSIUM SERPL-SCNC: 5.9 MMOL/L — HIGH (ref 3.5–5)
POTASSIUM SERPL-SCNC: 6.2 MMOL/L — CRITICAL HIGH (ref 3.5–5)
POTASSIUM SERPL-SCNC: 6.2 MMOL/L — CRITICAL HIGH (ref 3.5–5)
POTASSIUM SERPL-SCNC: 6.7 MMOL/L — CRITICAL HIGH (ref 3.5–5)
PROT SERPL-MCNC: 5.8 G/DL — LOW (ref 6–8)
PROT SERPL-MCNC: 5.8 G/DL — LOW (ref 6–8)
PROT SERPL-MCNC: 6.2 G/DL — SIGNIFICANT CHANGE UP (ref 6–8)
PROT SERPL-MCNC: 6.2 G/DL — SIGNIFICANT CHANGE UP (ref 6–8)
PROT UR-MCNC: NEGATIVE MG/DL — SIGNIFICANT CHANGE UP
PROT UR-MCNC: NEGATIVE MG/DL — SIGNIFICANT CHANGE UP
RBC # BLD: 4.79 M/UL — SIGNIFICANT CHANGE UP (ref 4.2–5.4)
RBC # BLD: 4.79 M/UL — SIGNIFICANT CHANGE UP (ref 4.2–5.4)
RBC # BLD: 5.21 M/UL — SIGNIFICANT CHANGE UP (ref 4.2–5.4)
RBC # BLD: 5.21 M/UL — SIGNIFICANT CHANGE UP (ref 4.2–5.4)
RBC # FLD: 14.9 % — HIGH (ref 11.5–14.5)
RBC # FLD: 14.9 % — HIGH (ref 11.5–14.5)
RBC # FLD: 15.2 % — HIGH (ref 11.5–14.5)
RBC # FLD: 15.2 % — HIGH (ref 11.5–14.5)
RBC CASTS # UR COMP ASSIST: 5 /HPF — HIGH (ref 0–4)
RBC CASTS # UR COMP ASSIST: 5 /HPF — HIGH (ref 0–4)
SAO2 % BLDV: 35.7 % — LOW (ref 67–88)
SAO2 % BLDV: 35.7 % — LOW (ref 67–88)
SODIUM SERPL-SCNC: 134 MMOL/L — LOW (ref 135–146)
SODIUM SERPL-SCNC: 136 MMOL/L — SIGNIFICANT CHANGE UP (ref 135–146)
SODIUM SERPL-SCNC: 136 MMOL/L — SIGNIFICANT CHANGE UP (ref 135–146)
SODIUM SERPL-SCNC: 138 MMOL/L — SIGNIFICANT CHANGE UP (ref 135–146)
SODIUM SERPL-SCNC: 138 MMOL/L — SIGNIFICANT CHANGE UP (ref 135–146)
SP GR SPEC: 1.02 — SIGNIFICANT CHANGE UP (ref 1–1.03)
SP GR SPEC: 1.02 — SIGNIFICANT CHANGE UP (ref 1–1.03)
SQUAMOUS # UR AUTO: 2 /HPF — SIGNIFICANT CHANGE UP (ref 0–5)
SQUAMOUS # UR AUTO: 2 /HPF — SIGNIFICANT CHANGE UP (ref 0–5)
TROPONIN T, HIGH SENSITIVITY RESULT: 475 NG/L — CRITICAL HIGH (ref 6–13)
TROPONIN T, HIGH SENSITIVITY RESULT: 475 NG/L — CRITICAL HIGH (ref 6–13)
UROBILINOGEN FLD QL: 0.2 MG/DL — SIGNIFICANT CHANGE UP (ref 0.2–1)
UROBILINOGEN FLD QL: 0.2 MG/DL — SIGNIFICANT CHANGE UP (ref 0.2–1)
WBC # BLD: 27.94 K/UL — HIGH (ref 4.8–10.8)
WBC # BLD: 27.94 K/UL — HIGH (ref 4.8–10.8)
WBC # BLD: 29.27 K/UL — HIGH (ref 4.8–10.8)
WBC # BLD: 29.27 K/UL — HIGH (ref 4.8–10.8)
WBC # FLD AUTO: 27.94 K/UL — HIGH (ref 4.8–10.8)
WBC # FLD AUTO: 27.94 K/UL — HIGH (ref 4.8–10.8)
WBC # FLD AUTO: 29.27 K/UL — HIGH (ref 4.8–10.8)
WBC # FLD AUTO: 29.27 K/UL — HIGH (ref 4.8–10.8)
WBC UR QL: 6 /HPF — HIGH (ref 0–5)
WBC UR QL: 6 /HPF — HIGH (ref 0–5)

## 2023-12-29 PROCEDURE — 85018 HEMOGLOBIN: CPT

## 2023-12-29 PROCEDURE — 84132 ASSAY OF SERUM POTASSIUM: CPT

## 2023-12-29 PROCEDURE — 93970 EXTREMITY STUDY: CPT

## 2023-12-29 PROCEDURE — 86803 HEPATITIS C AB TEST: CPT

## 2023-12-29 PROCEDURE — 83036 HEMOGLOBIN GLYCOSYLATED A1C: CPT

## 2023-12-29 PROCEDURE — 83735 ASSAY OF MAGNESIUM: CPT

## 2023-12-29 PROCEDURE — 93306 TTE W/DOPPLER COMPLETE: CPT

## 2023-12-29 PROCEDURE — 90935 HEMODIALYSIS ONE EVALUATION: CPT

## 2023-12-29 PROCEDURE — 86704 HEP B CORE ANTIBODY TOTAL: CPT

## 2023-12-29 PROCEDURE — 85027 COMPLETE CBC AUTOMATED: CPT

## 2023-12-29 PROCEDURE — 82330 ASSAY OF CALCIUM: CPT

## 2023-12-29 PROCEDURE — 71250 CT THORAX DX C-: CPT | Mod: 26,MA

## 2023-12-29 PROCEDURE — 71045 X-RAY EXAM CHEST 1 VIEW: CPT | Mod: 26,77

## 2023-12-29 PROCEDURE — 82803 BLOOD GASES ANY COMBINATION: CPT

## 2023-12-29 PROCEDURE — 83605 ASSAY OF LACTIC ACID: CPT

## 2023-12-29 PROCEDURE — 93970 EXTREMITY STUDY: CPT | Mod: 26

## 2023-12-29 PROCEDURE — 71045 X-RAY EXAM CHEST 1 VIEW: CPT

## 2023-12-29 PROCEDURE — 84145 PROCALCITONIN (PCT): CPT

## 2023-12-29 PROCEDURE — 85014 HEMATOCRIT: CPT

## 2023-12-29 PROCEDURE — 93010 ELECTROCARDIOGRAM REPORT: CPT

## 2023-12-29 PROCEDURE — 82962 GLUCOSE BLOOD TEST: CPT

## 2023-12-29 PROCEDURE — 87641 MR-STAPH DNA AMP PROBE: CPT

## 2023-12-29 PROCEDURE — 85025 COMPLETE CBC W/AUTO DIFF WBC: CPT

## 2023-12-29 PROCEDURE — 99291 CRITICAL CARE FIRST HOUR: CPT

## 2023-12-29 PROCEDURE — 93005 ELECTROCARDIOGRAM TRACING: CPT

## 2023-12-29 PROCEDURE — 87340 HEPATITIS B SURFACE AG IA: CPT

## 2023-12-29 PROCEDURE — 36415 COLL VENOUS BLD VENIPUNCTURE: CPT

## 2023-12-29 PROCEDURE — 74176 CT ABD & PELVIS W/O CONTRAST: CPT | Mod: 26,MA

## 2023-12-29 PROCEDURE — 87040 BLOOD CULTURE FOR BACTERIA: CPT

## 2023-12-29 PROCEDURE — 93306 TTE W/DOPPLER COMPLETE: CPT | Mod: 26

## 2023-12-29 PROCEDURE — 86706 HEP B SURFACE ANTIBODY: CPT

## 2023-12-29 PROCEDURE — 87449 NOS EACH ORGANISM AG IA: CPT

## 2023-12-29 PROCEDURE — 84295 ASSAY OF SERUM SODIUM: CPT

## 2023-12-29 PROCEDURE — 80053 COMPREHEN METABOLIC PANEL: CPT

## 2023-12-29 PROCEDURE — 84484 ASSAY OF TROPONIN QUANT: CPT

## 2023-12-29 PROCEDURE — 80048 BASIC METABOLIC PNL TOTAL CA: CPT

## 2023-12-29 PROCEDURE — 94003 VENT MGMT INPAT SUBQ DAY: CPT

## 2023-12-29 PROCEDURE — 87640 STAPH A DNA AMP PROBE: CPT

## 2023-12-29 PROCEDURE — 71045 X-RAY EXAM CHEST 1 VIEW: CPT | Mod: 26,76

## 2023-12-29 RX ORDER — INSULIN LISPRO 100/ML
VIAL (ML) SUBCUTANEOUS
Refills: 0 | Status: DISCONTINUED | OUTPATIENT
Start: 2023-12-29 | End: 2023-12-30

## 2023-12-29 RX ORDER — NOREPINEPHRINE BITARTRATE/D5W 8 MG/250ML
0.05 PLASTIC BAG, INJECTION (ML) INTRAVENOUS
Qty: 16 | Refills: 0 | Status: DISCONTINUED | OUTPATIENT
Start: 2023-12-29 | End: 2023-12-30

## 2023-12-29 RX ORDER — INSULIN GLARGINE 100 [IU]/ML
26 INJECTION, SOLUTION SUBCUTANEOUS AT BEDTIME
Refills: 0 | Status: DISCONTINUED | OUTPATIENT
Start: 2023-12-29 | End: 2023-12-30

## 2023-12-29 RX ORDER — ENOXAPARIN SODIUM 100 MG/ML
40 INJECTION SUBCUTANEOUS EVERY 24 HOURS
Refills: 0 | Status: DISCONTINUED | OUTPATIENT
Start: 2023-12-29 | End: 2023-12-30

## 2023-12-29 RX ORDER — SODIUM BICARBONATE 1 MEQ/ML
0.14 SYRINGE (ML) INTRAVENOUS
Qty: 150 | Refills: 0 | Status: DISCONTINUED | OUTPATIENT
Start: 2023-12-29 | End: 2023-12-30

## 2023-12-29 RX ORDER — SODIUM ZIRCONIUM CYCLOSILICATE 10 G/10G
10 POWDER, FOR SUSPENSION ORAL
Refills: 0 | Status: DISCONTINUED | OUTPATIENT
Start: 2023-12-29 | End: 2023-12-30

## 2023-12-29 RX ORDER — GLUCAGON INJECTION, SOLUTION 0.5 MG/.1ML
1 INJECTION, SOLUTION SUBCUTANEOUS ONCE
Refills: 0 | Status: DISCONTINUED | OUTPATIENT
Start: 2023-12-29 | End: 2023-12-30

## 2023-12-29 RX ORDER — DEXTROSE 50 % IN WATER 50 %
12.5 SYRINGE (ML) INTRAVENOUS ONCE
Refills: 0 | Status: DISCONTINUED | OUTPATIENT
Start: 2023-12-29 | End: 2023-12-30

## 2023-12-29 RX ORDER — DEXTROSE 50 % IN WATER 50 %
50 SYRINGE (ML) INTRAVENOUS ONCE
Refills: 0 | Status: COMPLETED | OUTPATIENT
Start: 2023-12-29 | End: 2023-12-29

## 2023-12-29 RX ORDER — AZITHROMYCIN 500 MG/1
500 TABLET, FILM COATED ORAL EVERY 24 HOURS
Refills: 0 | Status: DISCONTINUED | OUTPATIENT
Start: 2023-12-29 | End: 2023-12-30

## 2023-12-29 RX ORDER — CALCIUM GLUCONATE 100 MG/ML
1 VIAL (ML) INTRAVENOUS ONCE
Refills: 0 | Status: COMPLETED | OUTPATIENT
Start: 2023-12-28 | End: 2023-12-28

## 2023-12-29 RX ORDER — CEFEPIME 1 G/1
1000 INJECTION, POWDER, FOR SOLUTION INTRAMUSCULAR; INTRAVENOUS EVERY 12 HOURS
Refills: 0 | Status: DISCONTINUED | OUTPATIENT
Start: 2023-12-29 | End: 2023-12-30

## 2023-12-29 RX ORDER — CEFEPIME 1 G/1
2000 INJECTION, POWDER, FOR SOLUTION INTRAMUSCULAR; INTRAVENOUS ONCE
Refills: 0 | Status: COMPLETED | OUTPATIENT
Start: 2023-12-29 | End: 2023-12-29

## 2023-12-29 RX ORDER — ATORVASTATIN CALCIUM 80 MG/1
1 TABLET, FILM COATED ORAL
Refills: 0 | DISCHARGE

## 2023-12-29 RX ORDER — DESVENLAFAXINE 50 MG/1
1 TABLET, EXTENDED RELEASE ORAL
Refills: 0 | DISCHARGE

## 2023-12-29 RX ORDER — SODIUM BICARBONATE 1 MEQ/ML
50 SYRINGE (ML) INTRAVENOUS ONCE
Refills: 0 | Status: COMPLETED | OUTPATIENT
Start: 2023-12-28 | End: 2023-12-28

## 2023-12-29 RX ORDER — SODIUM BICARBONATE 1 MEQ/ML
0.18 SYRINGE (ML) INTRAVENOUS
Qty: 150 | Refills: 0 | Status: DISCONTINUED | OUTPATIENT
Start: 2023-12-29 | End: 2023-12-29

## 2023-12-29 RX ORDER — SODIUM CHLORIDE 9 MG/ML
1000 INJECTION, SOLUTION INTRAVENOUS
Refills: 0 | Status: DISCONTINUED | OUTPATIENT
Start: 2023-12-29 | End: 2023-12-29

## 2023-12-29 RX ORDER — QUETIAPINE FUMARATE 200 MG/1
25 TABLET, FILM COATED ORAL DAILY
Refills: 0 | Status: DISCONTINUED | OUTPATIENT
Start: 2023-12-29 | End: 2023-12-30

## 2023-12-29 RX ORDER — INSULIN GLARGINE 100 [IU]/ML
20 INJECTION, SOLUTION SUBCUTANEOUS
Refills: 0 | DISCHARGE

## 2023-12-29 RX ORDER — DEXTROSE 50 % IN WATER 50 %
15 SYRINGE (ML) INTRAVENOUS ONCE
Refills: 0 | Status: DISCONTINUED | OUTPATIENT
Start: 2023-12-29 | End: 2023-12-30

## 2023-12-29 RX ORDER — ASPIRIN/CALCIUM CARB/MAGNESIUM 324 MG
1 TABLET ORAL
Refills: 0 | DISCHARGE

## 2023-12-29 RX ORDER — DEXTROSE 50 % IN WATER 50 %
25 SYRINGE (ML) INTRAVENOUS ONCE
Refills: 0 | Status: COMPLETED | OUTPATIENT
Start: 2023-12-28 | End: 2023-12-28

## 2023-12-29 RX ORDER — INSULIN HUMAN 100 [IU]/ML
5 INJECTION, SOLUTION SUBCUTANEOUS ONCE
Refills: 0 | Status: COMPLETED | OUTPATIENT
Start: 2023-12-28 | End: 2023-12-28

## 2023-12-29 RX ORDER — SODIUM CHLORIDE 9 MG/ML
500 INJECTION, SOLUTION INTRAVENOUS ONCE
Refills: 0 | Status: COMPLETED | OUTPATIENT
Start: 2023-12-29 | End: 2023-12-29

## 2023-12-29 RX ORDER — INSULIN HUMAN 100 [IU]/ML
10 INJECTION, SOLUTION SUBCUTANEOUS ONCE
Refills: 0 | Status: COMPLETED | OUTPATIENT
Start: 2023-12-29 | End: 2023-12-29

## 2023-12-29 RX ORDER — METFORMIN HYDROCHLORIDE 850 MG/1
1 TABLET ORAL
Refills: 0 | DISCHARGE

## 2023-12-29 RX ORDER — DEXTROSE 50 % IN WATER 50 %
25 SYRINGE (ML) INTRAVENOUS ONCE
Refills: 0 | Status: DISCONTINUED | OUTPATIENT
Start: 2023-12-29 | End: 2023-12-30

## 2023-12-29 RX ORDER — SODIUM CHLORIDE 9 MG/ML
1000 INJECTION, SOLUTION INTRAVENOUS ONCE
Refills: 0 | Status: DISCONTINUED | OUTPATIENT
Start: 2023-12-29 | End: 2023-12-29

## 2023-12-29 RX ORDER — ALBUTEROL 90 UG/1
10 AEROSOL, METERED ORAL ONCE
Refills: 0 | Status: COMPLETED | OUTPATIENT
Start: 2023-12-28 | End: 2023-12-28

## 2023-12-29 RX ORDER — CALCIUM GLUCONATE 100 MG/ML
2 VIAL (ML) INTRAVENOUS ONCE
Refills: 0 | Status: COMPLETED | OUTPATIENT
Start: 2023-12-29 | End: 2023-12-29

## 2023-12-29 RX ORDER — SODIUM CHLORIDE 9 MG/ML
1000 INJECTION, SOLUTION INTRAVENOUS
Refills: 0 | Status: DISCONTINUED | OUTPATIENT
Start: 2023-12-29 | End: 2023-12-30

## 2023-12-29 RX ORDER — ASPIRIN/CALCIUM CARB/MAGNESIUM 324 MG
81 TABLET ORAL DAILY
Refills: 0 | Status: DISCONTINUED | OUTPATIENT
Start: 2023-12-29 | End: 2023-12-30

## 2023-12-29 RX ORDER — QUETIAPINE FUMARATE 200 MG/1
1 TABLET, FILM COATED ORAL
Refills: 0 | DISCHARGE

## 2023-12-29 RX ORDER — ATORVASTATIN CALCIUM 80 MG/1
20 TABLET, FILM COATED ORAL AT BEDTIME
Refills: 0 | Status: DISCONTINUED | OUTPATIENT
Start: 2023-12-29 | End: 2023-12-30

## 2023-12-29 RX ADMIN — Medication 3: at 18:03

## 2023-12-29 RX ADMIN — AZITHROMYCIN 255 MILLIGRAM(S): 500 TABLET, FILM COATED ORAL at 11:38

## 2023-12-29 RX ADMIN — CEFEPIME 100 MILLIGRAM(S): 1 INJECTION, POWDER, FOR SOLUTION INTRAMUSCULAR; INTRAVENOUS at 10:25

## 2023-12-29 RX ADMIN — QUETIAPINE FUMARATE 25 MILLIGRAM(S): 200 TABLET, FILM COATED ORAL at 14:40

## 2023-12-29 RX ADMIN — ATORVASTATIN CALCIUM 20 MILLIGRAM(S): 80 TABLET, FILM COATED ORAL at 23:44

## 2023-12-29 RX ADMIN — Medication 50 MILLIEQUIVALENT(S): at 00:18

## 2023-12-29 RX ADMIN — SODIUM ZIRCONIUM CYCLOSILICATE 10 GRAM(S): 10 POWDER, FOR SUSPENSION ORAL at 23:44

## 2023-12-29 RX ADMIN — INSULIN HUMAN 10 UNIT(S): 100 INJECTION, SOLUTION SUBCUTANEOUS at 10:21

## 2023-12-29 RX ADMIN — INSULIN HUMAN 5 UNIT(S): 100 INJECTION, SOLUTION SUBCUTANEOUS at 00:28

## 2023-12-29 RX ADMIN — Medication 100 MEQ/KG/HR: at 06:52

## 2023-12-29 RX ADMIN — Medication 50 MILLILITER(S): at 06:49

## 2023-12-29 RX ADMIN — Medication 50 MILLILITER(S): at 10:21

## 2023-12-29 RX ADMIN — PROPOFOL 10.2 MICROGRAM(S)/KG/MIN: 10 INJECTION, EMULSION INTRAVENOUS at 05:11

## 2023-12-29 RX ADMIN — CEFEPIME 100 MILLIGRAM(S): 1 INJECTION, POWDER, FOR SOLUTION INTRAMUSCULAR; INTRAVENOUS at 18:00

## 2023-12-29 RX ADMIN — CEFEPIME 100 MILLIGRAM(S): 1 INJECTION, POWDER, FOR SOLUTION INTRAMUSCULAR; INTRAVENOUS at 06:00

## 2023-12-29 RX ADMIN — Medication 75 MEQ/KG/HR: at 10:22

## 2023-12-29 RX ADMIN — Medication 200 GRAM(S): at 04:43

## 2023-12-29 RX ADMIN — Medication 100 GRAM(S): at 00:21

## 2023-12-29 RX ADMIN — Medication 3.98 MICROGRAM(S)/KG/MIN: at 18:00

## 2023-12-29 RX ADMIN — ENOXAPARIN SODIUM 40 MILLIGRAM(S): 100 INJECTION SUBCUTANEOUS at 10:25

## 2023-12-29 RX ADMIN — Medication 81 MILLIGRAM(S): at 14:40

## 2023-12-29 RX ADMIN — SODIUM CHLORIDE 500 MILLILITER(S): 9 INJECTION, SOLUTION INTRAVENOUS at 09:45

## 2023-12-29 RX ADMIN — Medication 4: at 14:41

## 2023-12-29 RX ADMIN — INSULIN GLARGINE 26 UNIT(S): 100 INJECTION, SOLUTION SUBCUTANEOUS at 23:05

## 2023-12-29 RX ADMIN — INSULIN HUMAN 10 UNIT(S): 100 INJECTION, SOLUTION SUBCUTANEOUS at 06:49

## 2023-12-29 RX ADMIN — CHLORHEXIDINE GLUCONATE 15 MILLILITER(S): 213 SOLUTION TOPICAL at 17:57

## 2023-12-29 NOTE — CHART NOTE - NSCHARTNOTEFT_GEN_A_CORE
ED reached out to the nephrology overnight team for hyperkalemia s/p cardiac arrest for this patient. Patient needs to be stabilised and hyperkalemia needs to be treated medically      ATN s/p cardiac arrest in setting of hypotension     - IV fluid hydration preferably isotonic bicarbonate  at 100 ml/hr  - Hyperkalemic to 6.9. Give insulin and D50 and lokelma 10 gms STAT  - Maintain MAP above 6  - US kidney and bladder to r/o obstructive pathology   - Urine electrolytes and urine osmolarity  - Avoid nephrotoxic medications  - Renally dose antibiotics   - Nephrology will follow   - Get stanley, Strict I/O to see if patient is oliguric  - Will monitor patient for need of RRT if hyperkalemia resistant,   - Full consult will follow soon

## 2023-12-29 NOTE — H&P ADULT - NSHPPHYSICALEXAM_GEN_ALL_CORE
General: Sedated on propofol, on mechanical ventilation  CV: RRR  Chest: Decreased breath sounds bibasilarly  Abdomen: Soft, mildly distended  Extremities: no edema, cyanosis or clubbing

## 2023-12-29 NOTE — PHARMACOTHERAPY INTERVENTION NOTE - COMMENTS
Recommended to order a Legionella urinary antigen since patient has been empirically started on azithromycin for the treatment of pneumonia.    Veronica Amor, PharmD, BCIDP  Clinical Pharmacy Specialist, Infectious Diseases  Tele-Antimicrobial Stewardship Program (Tele-ASP)  Tele-ASP Phone: [phone]   Recommended to order a Legionella urinary antigen since patient has been empirically started on azithromycin for the treatment of pneumonia.    Veronica Amor, PharmD, BCIDP  Clinical Pharmacy Specialist, Infectious Diseases  Tele-Antimicrobial Stewardship Program (Tele-ASP)  Tele-ASP Phone: [phone]

## 2023-12-29 NOTE — CONSULT NOTE ADULT - SUBJECTIVE AND OBJECTIVE BOX
NEPHROLOGY CONSULTATION NOTE    84yo female PMHx HLD, DM, HTN, arthritis presenting post cardiac arrest. Patient was found to be unconscious after an episode of coughing and chocking by family. CPR was initiated by family. Son is a PA here in the surgery department. Patient was intubated in the field by EMS Nephrology was reached for DEYSI and hyperkalemia. Spoke to the daughter of the patient and she wanted all interventions for the patient     PAST MEDICAL & SURGICAL HISTORY:  HTN (hypertension)  HLD (hyperlipidemia)  DM (diabetes mellitus)  Gout  History of bilateral knee replacement    Allergies:  No Known Allergies    Home Medications Reviewed  Hospital Medications:   MEDICATIONS  (STANDING):  chlorhexidine 0.12% Liquid 15 milliLiter(s) Oral Mucosa every 12 hours  norepinephrine Infusion 0.05 MICROgram(s)/kG/Min (3.98 mL/Hr) IV Continuous <Continuous>  propofol Infusion 20 MICROgram(s)/kG/Min (10.2 mL/Hr) IV Continuous <Continuous>  sodium bicarbonate  Infusion 0.176 mEq/kG/Hr (100 mL/Hr) IV Continuous <Continuous>    SOCIAL HISTORY:  Denies ETOH,Smoking,   FAMILY HISTORY:  No pertinent family history in first degree relatives        REVIEW OF SYSTEMS:    Intubated     VITALS:  Vital Signs Last 24 Hrs  T(C): 35.6 (29 Dec 2023 08:23), Max: 35.6 (29 Dec 2023 08:23)  T(F): 96 (29 Dec 2023 08:23), Max: 96 (29 Dec 2023 08:23)  HR: 78 (29 Dec 2023 08:23) (68 - 89)  BP: 129/76 (29 Dec 2023 08:23) (77/45 - 129/76)  BP(mean): 97 (29 Dec 2023 08:23) (97 - 97)  RR: 25 (29 Dec 2023 08:23) (18 - 25)  SpO2: 99% (29 Dec 2023 08:23) (96% - 99%)    Parameters below as of 29 Dec 2023 08:23  Patient On (Oxygen Delivery Method): ventilator    O2 Concentration (%): 100    Height (cm): 152.4 ( @ 08:23)  Weight (kg): 80.1 (- @ 08:23)  BMI (kg/m2): 34.5 (- @ 08:23)  BSA (m2): 1.77 ( @ 08:23)    PHYSICAL EXAM:    Respiratory: Intubated and vented   Cardiovascular: S1, S2, RRR  Gastrointestinal: BS+, soft, NT/ND  Extremities: No cyanosis or clubbing. No peripheral edema  : No CVA tenderness. No stanley.       LABS:      134<L>  |  97<L>  |  29<H>  ----------------------------<  252<H>  6.7<HH>   |  18  |  1.5    Ca    9.2      29 Dec 2023 02:33    TPro  5.8<L>  /  Alb  3.3<L>  /  TBili  0.7  /  DBili      /  AST  203<H>  /  ALT  71<H>  /  AlkPhos  81      Creatinine Trend: 1.5 <--, 1.1 <--                        14.1   .27 )-----------( 498      ( 29 Dec 2023 02:19 )             44.7     Urine Studies:  Urinalysis Basic - ( 29 Dec 2023 03:30 )    Color: Yellow / Appearance: Clear / S.023 / pH:   Gluc:  / Ketone: Negative mg/dL  / Bili: Negative / Urobili: 0.2 mg/dL   Blood:  / Protein: Negative mg/dL / Nitrite: Negative   Leuk Esterase: Trace / RBC: 5 /HPF / WBC 6 /HPF   Sq Epi:  / Non Sq Epi: 2 /HPF / Bacteria: Negative /HPF        RADIOLOGY & ADDITIONAL STUDIES:    ABDOMEN/PELVIS:    HEPATOBILIARY: No focal abnormality within the unenhanced liver.    SPLEEN: Few splenic calcified granulomas.    PANCREAS: Unremarkable.    ADRENAL GLANDS: Unremarkable.    KIDNEYS: Atrophic appearing bilateral kidneys. No hydronephrosis. Bilateral nephrolithiasis including a nonobstructing right staghorn calculus.    ABDOMINOPELVIC NODES: No definite lymphadenopathy identified.      IMPRESSION:  1. Bilateral second through eighth anterior/anterolateral acute rib fractures,, some of which are nondisplaced and some minimally displaced. Acute incomplete depressed fracture of the anterior cortex of the sternal body. No pneumothorax. Findings presumed to be secondary to given history of CPR.  2. Multifocal bilateral lung consolidations more prominent in the lower lobes with possible superimposed atelectasis. Possible aspiration pneumonia suspected given the history.  3. No acute abdominopelvic pathology on this unenhanced exam.  4. Properly positioned support devices.               NEPHROLOGY CONSULTATION NOTE    82yo female PMHx HLD, DM, HTN, arthritis presenting post cardiac arrest. Patient was found to be unconscious after an episode of coughing and chocking by family. CPR was initiated by family. Son is a PA here in the surgery department. Patient was intubated in the field by EMS Nephrology was reached for DEYSI and hyperkalemia. Spoke to the daughter of the patient and she wanted all interventions for the patient     PAST MEDICAL & SURGICAL HISTORY:  HTN (hypertension)  HLD (hyperlipidemia)  DM (diabetes mellitus)  Gout  History of bilateral knee replacement    Allergies:  No Known Allergies    Home Medications Reviewed  Hospital Medications:   MEDICATIONS  (STANDING):  chlorhexidine 0.12% Liquid 15 milliLiter(s) Oral Mucosa every 12 hours  norepinephrine Infusion 0.05 MICROgram(s)/kG/Min (3.98 mL/Hr) IV Continuous <Continuous>  propofol Infusion 20 MICROgram(s)/kG/Min (10.2 mL/Hr) IV Continuous <Continuous>  sodium bicarbonate  Infusion 0.176 mEq/kG/Hr (100 mL/Hr) IV Continuous <Continuous>    SOCIAL HISTORY:  Denies ETOH,Smoking,   FAMILY HISTORY:  No pertinent family history in first degree relatives        REVIEW OF SYSTEMS:    Intubated     VITALS:  Vital Signs Last 24 Hrs  T(C): 35.6 (29 Dec 2023 08:23), Max: 35.6 (29 Dec 2023 08:23)  T(F): 96 (29 Dec 2023 08:23), Max: 96 (29 Dec 2023 08:23)  HR: 78 (29 Dec 2023 08:23) (68 - 89)  BP: 129/76 (29 Dec 2023 08:23) (77/45 - 129/76)  BP(mean): 97 (29 Dec 2023 08:23) (97 - 97)  RR: 25 (29 Dec 2023 08:23) (18 - 25)  SpO2: 99% (29 Dec 2023 08:23) (96% - 99%)    Parameters below as of 29 Dec 2023 08:23  Patient On (Oxygen Delivery Method): ventilator    O2 Concentration (%): 100    Height (cm): 152.4 ( @ 08:23)  Weight (kg): 80.1 (- @ 08:23)  BMI (kg/m2): 34.5 (- @ 08:23)  BSA (m2): 1.77 ( @ 08:23)    PHYSICAL EXAM:    Respiratory: Intubated and vented   Cardiovascular: S1, S2, RRR  Gastrointestinal: BS+, soft, NT/ND  Extremities: No cyanosis or clubbing. No peripheral edema  : No CVA tenderness. No stanley.       LABS:      134<L>  |  97<L>  |  29<H>  ----------------------------<  252<H>  6.7<HH>   |  18  |  1.5    Ca    9.2      29 Dec 2023 02:33    TPro  5.8<L>  /  Alb  3.3<L>  /  TBili  0.7  /  DBili      /  AST  203<H>  /  ALT  71<H>  /  AlkPhos  81      Creatinine Trend: 1.5 <--, 1.1 <--                        14.1   .27 )-----------( 498      ( 29 Dec 2023 02:19 )             44.7     Urine Studies:  Urinalysis Basic - ( 29 Dec 2023 03:30 )    Color: Yellow / Appearance: Clear / S.023 / pH:   Gluc:  / Ketone: Negative mg/dL  / Bili: Negative / Urobili: 0.2 mg/dL   Blood:  / Protein: Negative mg/dL / Nitrite: Negative   Leuk Esterase: Trace / RBC: 5 /HPF / WBC 6 /HPF   Sq Epi:  / Non Sq Epi: 2 /HPF / Bacteria: Negative /HPF        RADIOLOGY & ADDITIONAL STUDIES:    ABDOMEN/PELVIS:    HEPATOBILIARY: No focal abnormality within the unenhanced liver.    SPLEEN: Few splenic calcified granulomas.    PANCREAS: Unremarkable.    ADRENAL GLANDS: Unremarkable.    KIDNEYS: Atrophic appearing bilateral kidneys. No hydronephrosis. Bilateral nephrolithiasis including a nonobstructing right staghorn calculus.    ABDOMINOPELVIC NODES: No definite lymphadenopathy identified.      IMPRESSION:  1. Bilateral second through eighth anterior/anterolateral acute rib fractures,, some of which are nondisplaced and some minimally displaced. Acute incomplete depressed fracture of the anterior cortex of the sternal body. No pneumothorax. Findings presumed to be secondary to given history of CPR.  2. Multifocal bilateral lung consolidations more prominent in the lower lobes with possible superimposed atelectasis. Possible aspiration pneumonia suspected given the history.  3. No acute abdominopelvic pathology on this unenhanced exam.  4. Properly positioned support devices.               NEPHROLOGY CONSULTATION NOTE    84yo female PMHx HLD, DM, HTN, arthritis presenting post cardiac arrest. Patient was found to be unconscious after choking on food. CPR was initiated by family. Son is a PA here in the surgery department. Patient was intubated in the field by EMS Nephrology was reached for DEYSI and hyperkalemia. Spoke to the daughter of the patient and she wanted all interventions for the patient     PAST MEDICAL & SURGICAL HISTORY:  HTN (hypertension)  HLD (hyperlipidemia)  DM (diabetes mellitus)  Gout  History of bilateral knee replacement    Allergies:  No Known Allergies    Home Medications Reviewed  Hospital Medications:   MEDICATIONS  (STANDING):  chlorhexidine 0.12% Liquid 15 milliLiter(s) Oral Mucosa every 12 hours  norepinephrine Infusion 0.05 MICROgram(s)/kG/Min (3.98 mL/Hr) IV Continuous <Continuous>  propofol Infusion 20 MICROgram(s)/kG/Min (10.2 mL/Hr) IV Continuous <Continuous>  sodium bicarbonate  Infusion 0.176 mEq/kG/Hr (100 mL/Hr) IV Continuous <Continuous>    SOCIAL HISTORY:  Denies ETOH,Smoking,   FAMILY HISTORY:  No pertinent family history in first degree relatives        REVIEW OF SYSTEMS:    Intubated     VITALS:  Vital Signs Last 24 Hrs  T(C): 35.6 (29 Dec 2023 08:23), Max: 35.6 (29 Dec 2023 08:23)  T(F): 96 (29 Dec 2023 08:23), Max: 96 (29 Dec 2023 08:23)  HR: 78 (29 Dec 2023 08:23) (68 - 89)  BP: 129/76 (29 Dec 2023 08:23) (77/45 - 129/76)  BP(mean): 97 (29 Dec 2023 08:23) (97 - 97)  RR: 25 (29 Dec 2023 08:23) (18 - 25)  SpO2: 99% (29 Dec 2023 08:23) (96% - 99%)    Parameters below as of 29 Dec 2023 08:23  Patient On (Oxygen Delivery Method): ventilator    O2 Concentration (%): 100    Height (cm): 152.4 ( @ 08:23)  Weight (kg): 80.1 ( @ 08:23)  BMI (kg/m2): 34.5 ( @ 08:23)  BSA (m2): 1.77 ( @ 08:23)    PHYSICAL EXAM:    Respiratory: Intubated and vented   Cardiovascular: S1, S2, RRR  Gastrointestinal: BS+, soft, NT/ND  Extremities: No cyanosis or clubbing. No peripheral edema  : No CVA tenderness. No stanley.       LABS:      134<L>  |  97<L>  |  29<H>  ----------------------------<  252<H>  6.7<HH>   |  18  |  1.5    Ca    9.2      29 Dec 2023 02:33    TPro  5.8<L>  /  Alb  3.3<L>  /  TBili  0.7  /  DBili      /  AST  203<H>  /  ALT  71<H>  /  AlkPhos  81      Creatinine Trend: 1.5 <--, 1.1 <--                        14.1   . )-----------( 498      ( 29 Dec 2023 02:19 )             44.7     Urine Studies:  Urinalysis Basic - ( 29 Dec 2023 03:30 )    Color: Yellow / Appearance: Clear / S.023 / pH:   Gluc:  / Ketone: Negative mg/dL  / Bili: Negative / Urobili: 0.2 mg/dL   Blood:  / Protein: Negative mg/dL / Nitrite: Negative   Leuk Esterase: Trace / RBC: 5 /HPF / WBC 6 /HPF   Sq Epi:  / Non Sq Epi: 2 /HPF / Bacteria: Negative /HPF        RADIOLOGY & ADDITIONAL STUDIES:    ABDOMEN/PELVIS:    HEPATOBILIARY: No focal abnormality within the unenhanced liver.    SPLEEN: Few splenic calcified granulomas.    PANCREAS: Unremarkable.    ADRENAL GLANDS: Unremarkable.    KIDNEYS: Atrophic appearing bilateral kidneys. No hydronephrosis. Bilateral nephrolithiasis including a nonobstructing right staghorn calculus.    ABDOMINOPELVIC NODES: No definite lymphadenopathy identified.      IMPRESSION:  1. Bilateral second through eighth anterior/anterolateral acute rib fractures,, some of which are nondisplaced and some minimally displaced. Acute incomplete depressed fracture of the anterior cortex of the sternal body. No pneumothorax. Findings presumed to be secondary to given history of CPR.  2. Multifocal bilateral lung consolidations more prominent in the lower lobes with possible superimposed atelectasis. Possible aspiration pneumonia suspected given the history.  3. No acute abdominopelvic pathology on this unenhanced exam.  4. Properly positioned support devices.

## 2023-12-29 NOTE — ED PROVIDER NOTE - PHYSICAL EXAMINATION
VITAL SIGNS: I have reviewed nursing notes and confirm.  CONSTITUTIONAL: intubated elderly female, breathing above bagging/vent  SKIN: Warm dry, normal skin turgor  HEAD: NCAT  EYES: Pupils 4 mm and sluggish but reactive  ENT: patient intubated, small pieces of roast beef in oral mucosa  CARD: RRR, no murmurs, rubs or gallops  RESP: intubated, b/l breath sounds  ABD: soft, + BS, distended abdomen  EXT:  no pedal edema  NEURO: does not follow commands or withdraw to painful stimuli, sedated

## 2023-12-29 NOTE — PATIENT PROFILE ADULT - FALL HARM RISK - UNIVERSAL INTERVENTIONS
Bed in lowest position, wheels locked, appropriate side rails in place/Call bell, personal items and telephone in reach/Instruct patient to call for assistance before getting out of bed or chair/Non-slip footwear when patient is out of bed/[place] to call system/Physically safe environment - no spills, clutter or unnecessary equipment/Purposeful Proactive Rounding/Room/bathroom lighting operational, light cord in reach Bed in lowest position, wheels locked, appropriate side rails in place/Call bell, personal items and telephone in reach/Instruct patient to call for assistance before getting out of bed or chair/Non-slip footwear when patient is out of bed/[place] to call system/Physically safe environment - no spills, clutter or unnecessary equipment/Purposeful Proactive Rounding/Room/bathroom lighting operational, light cord in reach

## 2023-12-29 NOTE — CONSULT NOTE ADULT - ASSESSMENT
- cardiac arrest, choking episode  - acute hypoxic resp failure  - anuric, hyperkalemic - to start HD  - shock on pressors, + leukocytosis, ? septic  - DM      PLAN:  - to start HD  - obtain A1c level  - glycemic control  - monitor propofol dose (r/t caloric provision)  - if no further vomiting and abdominal exam stable, hope to start enteral feeding tomorrow       will f/u with CCU team in am

## 2023-12-29 NOTE — CONSULT NOTE ADULT - ATTENDING COMMENTS
82yo female PMHx HLD, DM, HTN, arthritis presenting post cardiac arrest. Patient was found to be unconscious after choking on food. CPR was initiated by family. Son is a PA here in the surgery department. Patient was intubated in the field by EMS Nephrology was reached for DEYSI and hyperkalemia. Spoke to the daughter of the patient and she wanted all interventions for the patient     Oliguric ATN/ Hyperkalemia/ S/p cardiac arrest   - S/p cardiac arrest   - Cr 1.56 on admission; creat 1.2 in 9/2021 c/w CKD 3b  - IV fluid hydration with isotonic bicarbonate at at 100 ml/hr  - Oliguric   - Hyperkalemic to 6.7 and s/p insulin+D50 and lokelma 10 gms. Get BMP now. In view of oliguria the patient will have worsening hyperkalemia and will benefit from RRT- HD at this point. Get U dall. Consent  for HD in the chart   - On a single pressor. Maintain MAP above 6.5   - C/w lokelma 10 gms BID  -K is down to 3.8 an hour after Insulin and glucose was pushed and likely represents shift in K intracellularly  -no UO  - cont IVF  check ABGlytes stat to determine the need for HDD  do not place [place] yet  - Imaging ruled out obstructive uropathy  will follow 84yo female PMHx HLD, DM, HTN, arthritis presenting post cardiac arrest. Patient was found to be unconscious after choking on food. CPR was initiated by family. Son is a PA here in the surgery department. Patient was intubated in the field by EMS Nephrology was reached for DEYSI and hyperkalemia. Spoke to the daughter of the patient and she wanted all interventions for the patient     Oliguric ATN/ Hyperkalemia/ S/p cardiac arrest   - S/p cardiac arrest   - Cr 1.56 on admission; creat 1.2 in 9/2021 c/w CKD 3b  - IV fluid hydration with isotonic bicarbonate at at 100 ml/hr  - Oliguric   - Hyperkalemic to 6.7 and s/p insulin+D50 and lokelma 10 gms. Get BMP now. In view of oliguria the patient will have worsening hyperkalemia and will benefit from RRT- HD at this point. Get U dall. Consent  for HD in the chart   - On a single pressor. Maintain MAP above 6.5   - C/w lokelma 10 gms BID  -K is down to 3.8 an hour after Insulin and glucose was pushed and likely represents shift in K intracellularly  -no UO  - cont IVF  check ABGlytes stat to determine the need for HDD  do not place [place] yet  - Imaging ruled out obstructive uropathy  will follow

## 2023-12-29 NOTE — ED PROVIDER NOTE - CARE PLAN
1 Principal Discharge DX:	Cardiac arrest  Secondary Diagnosis:	Hyperkalemia  Secondary Diagnosis:	Elevated lactic acid level

## 2023-12-29 NOTE — H&P ADULT - HISTORY OF PRESENT ILLNESS
82yo female PMHx HLD, DM, HTN, arthritis presenting post cardiac arrest. Patient was found to be unconscious after an episode of coughing and chocking by family. CPR was initiated by family. Son is a PA here in the surgery department. Patient was intubated in the field by EMS.     In ED vitals were  T(F): --  HR: 89  BP: --  RR: 18  SpO2: 98%    Labs were significant for leukocytosis with WBC 29K, platelets 506, K+ 5.9,  ALT 65, BUN 24, Cr 1.1.     VBG: lactate 7.4, pH 7.11, Na+ 131, K+ 5.6,   84yo female PMHx HLD, DM, HTN, arthritis presenting post cardiac arrest. Patient was found to be unconscious after an episode of coughing and chocking by family. CPR was initiated by family. Son is a PA here in the surgery department. Patient was intubated in the field by EMS.     In ED vitals were  T(F): --  HR: 89  BP: --  RR: 18  SpO2: 98%    Labs were significant for leukocytosis with WBC 29K, platelets 506, K+ 5.9,  ALT 65, BUN 24, Cr 1.1.     VBG: lactate 7.4, pH 7.11, Na+ 131, K+ 5.6,   84yo female PMHx HLD, DM, HTN, arthritis presenting post cardiac arrest.   Patient was found to be unconscious after an episode of coughing and chocking by family.   CPR was initiated by family and ROSC seems to have been achieved before EMS arrived, as no CPR was required en route or when she presented to the ED.  Family does not report that the patient had any dysphagia events from before, no shortness of breath, chest pain, focal neurologic symptoms, abdominal pain, changes in bowel/ urinary habits, or constitutional symptoms.    In ED, patient had a BP in the 80s, HR=80, afebrile.  Labs were significant for leukocytosis with WBC 29K, platelets 506, K+ 5.9,  ALT 65, BUN 24, Cr 1.1.   VBG: lactate 7.4, pH 7.11, Na+ 131, K+ 5.6,      A CT scan of the chest was done in the ED showed bilateral bibasilar opacities, no abdominopelvic pathology.    Patient will be admitted to ICU for management. 82yo female PMHx HLD, DM, HTN, arthritis presenting post cardiac arrest.   Patient was found to be unconscious after an episode of coughing and chocking by family.   CPR was initiated by family and ROSC seems to have been achieved before EMS arrived, as no CPR was required en route or when she presented to the ED.  Family does not report that the patient had any dysphagia events from before, no shortness of breath, chest pain, focal neurologic symptoms, abdominal pain, changes in bowel/ urinary habits, or constitutional symptoms.    In ED, patient had a BP in the 80s, HR=80, afebrile.  Labs were significant for leukocytosis with WBC 29K, platelets 506, K+ 5.9,  ALT 65, BUN 24, Cr 1.1.   VBG: lactate 7.4, pH 7.11, Na+ 131, K+ 5.6,      A CT scan of the chest was done in the ED showed bilateral bibasilar opacities, no abdominopelvic pathology.    Patient will be admitted to ICU for management.

## 2023-12-29 NOTE — ED PROVIDER NOTE - ATTENDING CONTRIBUTION TO CARE
I personally evaluated patient. I agree with the findings and plan with all documentation on chart except as documented  in my note.    83-year-old female past medical history of hypertension, diabetes, dyslipidemia, arthritis who presents to the emergency department after choking at home and suffering a cardiac arrest.  Patient was eating with family was eating roast beef when she made a signal that she was choking.  Patient turned cyanotic and family lowered her to the floor and started ACLS.  She was given chest compressions and breaths by family and EMS was immediately called.  When EMS arrived, patient had pulses and required emergency intubation.  Patient had roast beef in her airway was successfully intubated.  Prearrival postcardiac arrest notification was given to the emergency department.    Patient rushed to the critical care trauma area.  End-tidal CO2 was normal patient had bilateral breath sounds and ET tube was confirmed.  Fingerstick taken.  Patient has good pulses initial vital signs reviewed.  Physical exam performed and patient has no signs of external trauma and has normal heart sounds.  Patient has a distended abdomen with normal bowel sounds.  Patient is breathing over ventilations but not moving extremities.  Pupils are 4 mm and reactive.  Large-bore IV access obtained and full labs sent, VBG performed, bedside chest x-ray performed and confirms tube placement.  Patient was started on propofol for sedation with fentanyl.  Initial VBG shows metabolic/respiratory acidosis patient was given IV fluids and was ventilated.  Repeat VBG shows elevated potassium to 7.3.  Patient's creatinine is normal but emergent [place] was placed and patient was given fluids and medical treatment for hyperkalemia.  Consult for renal placed and repeat EKG performed.  VBG was repeated and potassium improving to 6.3.  Patient is making small amount of urine, however, patient has an elevating lactate to 9.  Will get CT scan of the chest and abdomen pelvis.  Concern for possible ischemic colitis secondary to cardiac arrest given clinical picture.    Patient to be admitted to medical ICU for cardiac arrest.  Patient likely has aspiration pneumonia/pneumonitis, which does not require antibiotics but given elevated white blood cell count we will start broad-spectrum antibiotics.    ED work up reviewed and results and plan of care discussed with patients family. Patient requires admission for further work up, monitoring, and management. Need for admission discussed with patients family.

## 2023-12-29 NOTE — CONSULT NOTE ADULT - ASSESSMENT
Impression:     Cardio pulmonary arrest   Episode of choking - aspiration pneumonia   Acute kidney injury   Hyperkalemia   Lactic acidosis   Shock - septic ?     PLAN:    CNS: Sedated now with propofol. Appears comfortable. Daily SAT.     HEENT: Oral care; ETT care.     PULMONARY:  HOB @ 45 degrees.  Vent changes as follows: 400/24/100%/8. ABG stat.     CARDIOVASCULAR: Wean off Levophed. Bedside POCUS. Repeat troponin. EKG noted. BNP. Echocardiogram.  POCUS with collapsible IVC. LR 500cc bolus.     GI: GI prophylaxis. Place OGT. Place to suction. Reglan.     RENAL:  Follow up lytes.  Correct as needed. DEYSI. Insulin and D50. Lokelma. Trend lactate. No hydro on CT.    INFECTIOUS DISEASE: Follow up cultures: blood cultures, UA, DTA culture, procal. Start Cefepime and azithromycin. Nasal MRSA.     HEMATOLOGICAL:  DVT prophylaxis. Duplex LEs.     ENDOCRINE:  Follow up FS.  Insulin protocol if needed.    MUSCULOSKELETAL: bedrest.     Needs ICU care.

## 2023-12-29 NOTE — CONSULT NOTE ADULT - ASSESSMENT
INCOMPLETE NOTE  84yo female PMHx HLD, DM, HTN, arthritis presenting post cardiac arrest. Patient was found to be unconscious after an episode of coughing and chocking by family. CPR was initiated by family. Son is a PA here in the surgery department. Patient was intubated in the field by EMS Nephrology was reached for DEYSI and hyperkalemia. Spoke to the daughter of the patient and she wanted all interventions for the patient     Oliguric ATN/ Hyperkalemia/ S/p cardiac arrest   - S/p cardiac arrest   - Cr 1.56 on admission and  baseline unknow  - IV fluid hydration with isotinic bicarbonate at at 100 ml/hr  - Oliguric   - Hyperkalemic to 6.7 and s/p insulin+D50 and lokelma 10 gms. Get BMP now. In view of oliguria the patient will have worsening hyperkalemia and will benefit from RRT- HD at this point. Get U dall. Consent  for HD in the chart   - On a single pressor. Maintain MAP above 6.5   - C/w lokelma 10 gms BID  - BMP Q 4hly   - Imaging ruled out obstructive uropathy   - Get urine electrolytes and urine osmolarity  - Avoid nephrotoxic medications  - Renally dose antibiotics   - Nephrology will follow    82yo female PMHx HLD, DM, HTN, arthritis presenting post cardiac arrest. Patient was found to be unconscious after choking on food. CPR was initiated by family. Son is a PA here in the surgery department. Patient was intubated in the field by EMS Nephrology was reached for DEYSI and hyperkalemia. Spoke to the daughter of the patient and she wanted all interventions for the patient     Oliguric ATN/ Hyperkalemia/ S/p cardiac arrest   - S/p cardiac arrest   - Cr 1.56 on admission and  baseline unknow  - IV fluid hydration with isotinic bicarbonate at at 100 ml/hr  - Oliguric   - Hyperkalemic to 6.7 and s/p insulin+D50 and lokelma 10 gms. Get BMP now. In view of oliguria the patient will have worsening hyperkalemia and will benefit from RRT- HD at this point. Get U dall. Consent  for HD in the chart   - On a single pressor. Maintain MAP above 6.5   - C/w lokelma 10 gms BID  - BMP Q 4hly   - Imaging ruled out obstructive uropathy   - Get urine electrolytes and urine osmolarity  - Avoid nephrotoxic medications  - Renally dose antibiotics   - Nephrology will follow    84yo female PMHx HLD, DM, HTN, arthritis presenting post cardiac arrest. Patient was found to be unconscious after choking on food. CPR was initiated by family. Son is a PA here in the surgery department. Patient was intubated in the field by EMS Nephrology was reached for DEYSI and hyperkalemia. Spoke to the daughter of the patient and she wanted all interventions for the patient     Oliguric ATN/ Hyperkalemia/ S/p cardiac arrest   - S/p cardiac arrest   - Cr 1.56 on admission and  baseline unknow  - IV fluid hydration with isotonic bicarbonate at at 100 ml/hr  - Oliguric   - Hyperkalemic to 6.7 and s/p insulin+D50 and lokelma 10 gms. Get BMP now. In view of oliguria the patient will have worsening hyperkalemia and will benefit from RRT- HD at this point. Get U dall. Consent  for HD in the chart   - On a single pressor. Maintain MAP above 6.5   - C/w lokelma 10 gms BID  - BMP Q 4hly   - Imaging ruled out obstructive uropathy   - Get urine electrolytes and urine osmolarity  - Avoid nephrotoxic medications  - Renally dose antibiotics   - Nephrology will follow    82yo female PMHx HLD, DM, HTN, arthritis presenting post cardiac arrest. Patient was found to be unconscious after choking on food. CPR was initiated by family. Son is a PA here in the surgery department. Patient was intubated in the field by EMS Nephrology was reached for DEYSI and hyperkalemia. Spoke to the daughter of the patient and she wanted all interventions for the patient     Oliguric ATN/ Hyperkalemia/ S/p cardiac arrest   - S/p cardiac arrest   - Cr 1.56 on admission and  baseline unknow  - IV fluid hydration with isotonic bicarbonate at at 100 ml/hr  - Oliguric   - Hyperkalemic to 6.7 and s/p insulin+D50 and lokelma 10 gms. Get BMP now. In view of oliguria the patient will have worsening hyperkalemia and will benefit from RRT- HD at this point. Get U dall. Consent  for HD in the chart   - On a single pressor. Maintain MAP above 6.5   - C/w lokelma 10 gms BID  - BMP Q 4hly   - Imaging ruled out obstructive uropathy   - Get urine electrolytes and urine osmolarity  - Avoid nephrotoxic medications  - Renally dose antibiotics   - Nephrology will follow

## 2023-12-29 NOTE — ED PROVIDER NOTE - OBJECTIVE STATEMENT
83-year-old female past medical history HLD, DM, HTN, arthritis coming in with complaints of cardiac arrest.  Patient was witnessed to be choking at home during meal, subsequent went to cardiac arrest.  Witnessed by family, began CPR.  EMS arrived and found patient with pulses, intubated the patient, Valium given.

## 2023-12-29 NOTE — H&P ADULT - ASSESSMENT
Cardio pulmonary arrest   Episode of choking - aspiration pneumonia   Acute kidney injury   Hyperkalemia   Lactic acidosis   Shock - septic?    PLAN:    CNS: Sedated now with propofol. Appears comfortable. Daily SAT, hold Lyrica and Pristiq, c/w seroquel    HEENT: Oral care; ETT care.     PULMONARY:  HOB @ 45 degrees.  Vent changes as follows: 400/24/100%/8.    CARDIOVASCULAR: Wean off Levophed. Bedside POCUS. Repeat troponin. EKG noted. BNP. Echocardiogram.  POCUS with collapsible IVC. LR 500cc bolus and will maintain on bicarbonate drip. Hold enalapril    GI: GI prophylaxis. Place OGT. Place to suction.    RENAL:  Follow up lytes.  Correct as needed. DEYSI. Insulin and D50. Lokelma. Trend lactate. No hydro on CT. Bicarbonate drip. Might need HD and placement of a [facility]    INFECTIOUS DISEASE: Follow up cultures: blood cultures, UA, DTA culture, procal. Start Cefepime and azithromycin. Nasal MRSA.     HEMATOLOGICAL:  DVT prophylaxis with Lovenox 40 mg qday. Duplex LEs.     ENDOCRINE:  Follow up FS.  Insulin protocol if needed.    MUSCULOSKELETAL: bedrest.     Needs ICU care.    Cardio pulmonary arrest   Episode of choking - aspiration pneumonia   Acute kidney injury   Hyperkalemia   Lactic acidosis   Shock - septic?    PLAN:    CNS: Sedated now with propofol. Appears comfortable. Daily SAT, hold Lyrica and Pristiq, c/w seroquel    HEENT: Oral care; ETT care.     PULMONARY:  HOB @ 45 degrees.  Vent changes as follows: 400/24/100%/8.    CARDIOVASCULAR: Wean off Levophed. Bedside POCUS. Repeat troponin. EKG noted. BNP. Echocardiogram.  POCUS with collapsible IVC. LR 500cc bolus and will maintain on bicarbonate drip. Hold enalapril    GI: GI prophylaxis. Place OGT. Place to suction.    RENAL:  Follow up lytes.  Correct as needed. DEYSI. Insulin and D50. Lokelma. Trend lactate. No hydro on CT. Bicarbonate drip. Might need HD and placement of a [place]    INFECTIOUS DISEASE: Follow up cultures: blood cultures, UA, DTA culture, procal. Start Cefepime and azithromycin. Nasal MRSA.     HEMATOLOGICAL:  DVT prophylaxis with Lovenox 40 mg qday. Duplex LEs.     ENDOCRINE:  Follow up FS.  Insulin protocol if needed.    MUSCULOSKELETAL: bedrest.     Needs ICU care.    Cardio pulmonary arrest   Episode of choking - aspiration pneumonia   Acute kidney injury   Hyperkalemia   Lactic acidosis   Shock - septic?    PLAN:    CNS: Sedated now with propofol. Appears comfortable. Daily SAT, hold Lyrica and Pristiq, c/w seroquel    HEENT: Oral care; ETT care.     PULMONARY:  HOB @ 45 degrees.  Vent changes as follows: 400/24/100%/8.    CARDIOVASCULAR: Wean off Levophed. Bedside POCUS. Repeat troponin. EKG noted. BNP. Echocardiogram.  POCUS with collapsible IVC. LR 500cc bolus and will maintain on bicarbonate drip. Hold enalapril    GI: GI prophylaxis. Place OGT. Place to suction.    RENAL:  Follow up lytes.  Correct as needed. DEYSI. Insulin and D50. Lokelma. Trend lactate. No hydro on CT. Bicarbonate drip. Might need HD and placement of a [place]    INFECTIOUS DISEASE: Follow up cultures: blood cultures, UA, DTA culture, procal. Start Cefepime and azithromycin. Nasal MRSA.     HEMATOLOGICAL:  DVT prophylaxis with Lovenox 40 mg qday. Duplex LEs.     ENDOCRINE:  Follow up FS.  Insulin protocol if needed.    MUSCULOSKELETAL: bedrest.     Needs ICU care.

## 2023-12-29 NOTE — H&P ADULT - NSHPLABSRESULTS_GEN_ALL_CORE
LABS:                          13.4   27.94 )-----------( 334      ( 29 Dec 2023 09:39 )             41.1     12-29    138  |  109  |  27<H>  ----------------------------<  236<H>  3.8   |  12<L>  |  1.2    Ca    6.0<L>      29 Dec 2023 09:39  Mg     0.9     12-29    TPro  5.8<L>  /  Alb  3.3<L>  /  TBili  0.7  /  DBili  x   /  AST  203<H>  /  ALT  71<H>  /  AlkPhos  81  12-29    LIVER FUNCTIONS - ( 29 Dec 2023 02:33 )  Alb: 3.3 g/dL / Pro: 5.8 g/dL / ALK PHOS: 81 U/L / ALT: 71 U/L / AST: 203 U/L / GGT: x           PT/INR - ( 28 Dec 2023 21:50 )   PT: 12.40 sec;   INR: 1.09 ratio         PTT - ( 28 Dec 2023 21:50 )  PTT:40.7 sec  Urinalysis Basic - ( 29 Dec 2023 09:39 )    Color: x / Appearance: x / SG: x / pH: x  Gluc: 236 mg/dL / Ketone: x  / Bili: x / Urobili: x   Blood: x / Protein: x / Nitrite: x   Leuk Esterase: x / RBC: x / WBC x   Sq Epi: x / Non Sq Epi: x / Bacteria: x

## 2023-12-29 NOTE — CONSULT NOTE ADULT - SUBJECTIVE AND OBJECTIVE BOX
NUTRITION SUPPORT TEAM  -  CONSULT NOTE     82yo female PMHx HLD, DM, HTN, arthritis presenting post cardiac arrest. Patient was found to be unconscious after an episode of coughing and chocking by family. CPR was initiated by family. Son is a PA here in the surgery department. Patient was intubated in the field by EMS.     In ED vitals were  T(F): --  HR: 89  BP: --  RR: 18  SpO2: 98%    Labs were significant for leukocytosis with WBC 29K, platelets 506, K+ 5.9,  ALT 65, BUN 24, Cr 1.1.     VBG: lactate 7.4, pH 7.11, Na+ 131, K+ 5.6,   (29 Dec 2023 01:41)    NUTRITION SUPPORT NOTE:   --  preliminary evaluation  d/w CCU team on am rounds  pt vomited during eval  nephrology to see pt - pt anuric presently, and hyperkalemic  hope to start enteral feeding tomorrow, if stable    REVIEW OF SYSTEMS:  Negative except as noted above.     PAST MEDICAL/SURGICAL HISTORY:   HTN (hypertension)  HLD (hyperlipidemia)  DM (diabetes mellitus)  Gout  History of bilateral knee replacement    ALLERGIES:  No Known Allergies    VITALS:  T(F): 96 (12-29 @ 08:23), Max: 96 (12-29 @ 08:23)  HR: 78 (12-29 @ 09:00) (68 - 89)  BP: 129/76 (12-29 @ 08:23) (77/45 - 129/76)  RR: 25 (12-29 @ 08:23) (18 - 25)  SpO2: 99% (12-29 @ 09:00) (96% - 99%)    HEIGHT/WEIGHT/BMI:   Height (cm): 152.4 (12-29), 152.4 (12-09)  Weight (kg): 80.1 (12-29), 79.4 (12-09)  BMI (kg/m2): 34.5 (12-29), 34.2 (12-09)    PHYSICAL EXAM:   GENERAL: NAD, well-groomed, well-developed  HEENT: Moist mucous membranes, Good dentition, No lesions  ABDOMEN: Soft, Nontender, Nondistended  EXTREMITIES:  No clubbing, cyanosis, or edema  SKIN: warm and well perfused; No obvious rashes or lesions  IV ACCESS:   ENTERAL ACCESS:     STANDING MEDICATIONS:   azithromycin  IVPB 500 milliGRAM(s) IV Intermittent every 24 hours  cefepime   IVPB 1000 milliGRAM(s) IV Intermittent every 12 hours  chlorhexidine 0.12% Liquid 15 milliLiter(s) Oral Mucosa every 12 hours  dextrose 50% Injectable 50 milliLiter(s) IV Push once  enoxaparin Injectable 40 milliGRAM(s) SubCutaneous every 24 hours  insulin regular  human recombinant 10 Unit(s) IV Push once  lactated ringers Bolus 500 milliLiter(s) IV Bolus once  norepinephrine Infusion 0.05 MICROgram(s)/kG/Min IV Continuous <Continuous>  propofol Infusion 20 MICROgram(s)/kG/Min IV Continuous <Continuous>    Mode: AC/ CMV (Assist Control/ Continuous Mandatory Ventilation)  RR (machine): 24  TV (machine): 400  FiO2: 100  PEEP: 8  ITime: 1  MAP: 14  PIP: 24    LABS:                         14.1   29.27 )-----------( 498      ( 29 Dec 2023 02:19 )             44.7     134<L>  |  97<L>  |  29<H>  ----------------------------<  252<H>          (12-29-23 @ 02:33)  6.7<HH>   |  18  |  1.5    Ca    9.2          (12-29-23 @ 02:33)    TPro  5.8<L>  /  Alb  3.3<L>  /  TBili  0.7  /  DBili  x   /  AST  203<H>  /  ALT  71<H>  /  AlkPhos  81       12-29-23 @ 02:33      Triglycerides, Serum:     A1c:     Blood Glucose (Past 24 hours):  334 mg/dL (12-29 @ 07:07)  228 mg/dL (12-29 @ 06:46)  265 mg/dL (12-29 @ 05:56)  280 mg/dL (12-29 @ 01:01)  342 mg/dL (12-29 @ 00:24)    DIET: NPO    RADIOLOGY:   < from: CT Abdomen and Pelvis No Cont (12.29.23 @ 05:08) >    LUNGS/PLEURA/AIRWAYS: Endotracheal tube in satisfactory position   terminating 2.5 cm abovethe sangita. Multifocal bilateral consolidations   more prominent in the lower lobes. Bibasilar consolidations may represent   combination of infection and atelectasis. Detailed assessment limited due   to respiratory motion. No pneumothorax. No pleural effusion.    MEDIASTINUM/THORACIC NODES: No thoracic lymphadenopathy.    HEART/GREAT VESSELS: Right IJ central venous catheter tip terminates at   the cavoatrial junction. Marked coronary artery calcifications. No   pericardial effusion. Aortic atherosclerosis. Normal caliber thoracic   aorta and main pulmonary artery.      ABDOMEN/PELVIS:    HEPATOBILIARY: No focal abnormality within the unenhanced liver.    SPLEEN: Few splenic calcified granulomas.    PANCREAS: Unremarkable.    ADRENAL GLANDS: Unremarkable.    KIDNEYS: Atrophic appearing bilateral kidneys. No hydronephrosis.   Bilateral nephrolithiasis including a nonobstructing right staghorn   calculus.    ABDOMINOPELVIC NODES: No definite lymphadenopathy identified.    PELVIC ORGANS: Urinary bladder decompressed with Grant catheter. Uterus   is surgically absent.    < from: CT Abdomen and Pelvis No Cont (12.29.23 @ 05:08) >  PERITONEUM/MESENTERY/BOWEL: Enteric tube tip in the stomach. No bowel   obstruction. Colonic diverticulosis without evidence of acute   diverticulitis moderate colonic stool most notably in the right and   transverse colon may represent underlying constipation. No   pneumoperitoneum.    BONES/SOFT TISSUES: Bones are osteopenic. Degenerative changes are noted   throughout the vertebral column. Incomplete fracture through the anterior   cortex of the sternum (series 10 image 142; series 17 image 86).   Bilateral second through eighth anterior/anterolateral acute rib   fractures,, some of which are nondisplaced and some minimally displaced.    OTHER: Extensive atherosclerosis within the aorta and branch vessels. No   abdominal aortic aneurysm.  < end of copied text >     NUTRITION SUPPORT TEAM  -  CONSULT NOTE     84yo female PMHx HLD, DM, HTN, arthritis presenting post cardiac arrest. Patient was found to be unconscious after an episode of coughing and chocking by family. CPR was initiated by family. Son is a PA here in the surgery department. Patient was intubated in the field by EMS.     In ED vitals were  T(F): --  HR: 89  BP: --  RR: 18  SpO2: 98%    Labs were significant for leukocytosis with WBC 29K, platelets 506, K+ 5.9,  ALT 65, BUN 24, Cr 1.1.     VBG: lactate 7.4, pH 7.11, Na+ 131, K+ 5.6,   (29 Dec 2023 01:41)    NUTRITION SUPPORT NOTE:     d/w CCU team on am rounds  pt vomited during eval  nephrology to see pt - pt anuric presently, and hyperkalemic  hope to start enteral feeding tomorrow, if stable    REVIEW OF SYSTEMS:  Negative except as noted above.     PAST MEDICAL/SURGICAL HISTORY:   HTN (hypertension)  HLD (hyperlipidemia)  DM (diabetes mellitus)  Gout  History of bilateral knee replacement    ALLERGIES:  No Known Allergies    VITALS:  T(F): 96 (12-29 @ 08:23), Max: 96 (12-29 @ 08:23)  HR: 78 (12-29 @ 09:00) (68 - 89)  BP: 129/76 (12-29 @ 08:23) (77/45 - 129/76)  RR: 25 (12-29 @ 08:23) (18 - 25)  SpO2: 99% (12-29 @ 09:00) (96% - 99%)    HEIGHT/WEIGHT/BMI:   Height (cm): 152.4 (12-29), 152.4 (12-09)  Weight (kg): 80.1 (12-29), 79.4 (12-09)  BMI (kg/m2): 34.5 (12-29), 34.2 (12-09)    PHYSICAL EXAM:   GENERAL: intubated, sedated, + propofol, + bicarb drip, + pressors  HEENT: Moist mucous membranes, + OG [place]  ABDOMEN: Soft, Nontender, Nondistended, obese,   EXTREMITIES:  No clubbing, cyanosis, or edema  SKIN: warm and well perfused; No obvious rashes or lesions  IV ACCESS: right IJ TLC c/d/i  + stanley, no urine output    STANDING MEDICATIONS:   azithromycin  IVPB 500 milliGRAM(s) IV Intermittent every 24 hours  cefepime   IVPB 1000 milliGRAM(s) IV Intermittent every 12 hours  chlorhexidine 0.12% Liquid 15 milliLiter(s) Oral Mucosa every 12 hours  dextrose 50% Injectable 50 milliLiter(s) IV Push once  enoxaparin Injectable 40 milliGRAM(s) SubCutaneous every 24 hours  insulin regular  human recombinant 10 Unit(s) IV Push once  lactated ringers Bolus 500 milliLiter(s) IV Bolus once  norepinephrine Infusion 0.05 MICROgram(s)/kG/Min IV Continuous <Continuous>  propofol Infusion 20 MICROgram(s)/kG/Min IV Continuous <Continuous>    Mode: AC/ CMV (Assist Control/ Continuous Mandatory Ventilation)  RR (machine): 24  TV (machine): 400  FiO2: 100  PEEP: 8  ITime: 1  MAP: 14  PIP: 24  ABG - ( 29 Dec 2023 09:33 )  pH, Arterial: 7.26  pH, Blood: x     /  pCO2: 29    /  pO2: 185   / HCO3: 13    / Base Excess: -12.6 /  SaO2: 100.0     LABS:                         14.1   29.27 )-----------( 498      ( 29 Dec 2023 02:19 )             44.7     134<L>  |  97<L>  |  29<H>  ----------------------------<  252<H>          (12-29-23 @ 02:33)  6.7<HH>   |  18  |  1.5    Ca    9.2          (12-29-23 @ 02:33)    TPro  5.8<L>  /  Alb  3.3<L>  /  TBili  0.7  /  DBili  x   /  AST  203<H>  /  ALT  71<H>  /  AlkPhos  81       12-29-23 @ 02:33      Triglycerides, Serum:     A1c:     Blood Glucose (Past 24 hours):  334 mg/dL (12-29 @ 07:07)  228 mg/dL (12-29 @ 06:46)  265 mg/dL (12-29 @ 05:56)  280 mg/dL (12-29 @ 01:01)  342 mg/dL (12-29 @ 00:24)    DIET: NPO    RADIOLOGY:   < from: CT Abdomen and Pelvis No Cont (12.29.23 @ 05:08) >    LUNGS/PLEURA/AIRWAYS: Endotracheal tube in satisfactory position   terminating 2.5 cm abovethe sangita. Multifocal bilateral consolidations   more prominent in the lower lobes. Bibasilar consolidations may represent   combination of infection and atelectasis. Detailed assessment limited due   to respiratory motion. No pneumothorax. No pleural effusion.    MEDIASTINUM/THORACIC NODES: No thoracic lymphadenopathy.    HEART/GREAT VESSELS: Right IJ central venous catheter tip terminates at   the cavoatrial junction. Marked coronary artery calcifications. No   pericardial effusion. Aortic atherosclerosis. Normal caliber thoracic   aorta and main pulmonary artery.      ABDOMEN/PELVIS:    HEPATOBILIARY: No focal abnormality within the unenhanced liver.    SPLEEN: Few splenic calcified granulomas.    PANCREAS: Unremarkable.    ADRENAL GLANDS: Unremarkable.    KIDNEYS: Atrophic appearing bilateral kidneys. No hydronephrosis.   Bilateral nephrolithiasis including a nonobstructing right staghorn   calculus.    ABDOMINOPELVIC NODES: No definite lymphadenopathy identified.    PELVIC ORGANS: Urinary bladder decompressed with Stanley catheter. Uterus   is surgically absent.    < from: CT Abdomen and Pelvis No Cont (12.29.23 @ 05:08) >  PERITONEUM/MESENTERY/BOWEL: Enteric tube tip in the stomach. No bowel   obstruction. Colonic diverticulosis without evidence of acute   diverticulitis moderate colonic stool most notably in the right and   transverse colon may represent underlying constipation. No   pneumoperitoneum.    BONES/SOFT TISSUES: Bones are osteopenic. Degenerative changes are noted   throughout the vertebral column. Incomplete fracture through the anterior   cortex of the sternum (series 10 image 142; series 17 image 86).   Bilateral second through eighth anterior/anterolateral acute rib   fractures,, some of which are nondisplaced and some minimally displaced.    OTHER: Extensive atherosclerosis within the aorta and branch vessels. No   abdominal aortic aneurysm.  < end of copied text >     NUTRITION SUPPORT TEAM  -  CONSULT NOTE     82yo female PMHx HLD, DM, HTN, arthritis presenting post cardiac arrest. Patient was found to be unconscious after an episode of coughing and chocking by family. CPR was initiated by family. Son is a PA here in the surgery department. Patient was intubated in the field by EMS.     In ED vitals were  T(F): --  HR: 89  BP: --  RR: 18  SpO2: 98%    Labs were significant for leukocytosis with WBC 29K, platelets 506, K+ 5.9,  ALT 65, BUN 24, Cr 1.1.     VBG: lactate 7.4, pH 7.11, Na+ 131, K+ 5.6,   (29 Dec 2023 01:41)    NUTRITION SUPPORT NOTE:     d/w CCU team on am rounds  pt vomited during eval  nephrology to see pt - pt anuric presently, and hyperkalemic  hope to start enteral feeding tomorrow, if stable    REVIEW OF SYSTEMS:  Negative except as noted above.     PAST MEDICAL/SURGICAL HISTORY:   HTN (hypertension)  HLD (hyperlipidemia)  DM (diabetes mellitus)  Gout  History of bilateral knee replacement    ALLERGIES:  No Known Allergies    VITALS:  T(F): 96 (12-29 @ 08:23), Max: 96 (12-29 @ 08:23)  HR: 78 (12-29 @ 09:00) (68 - 89)  BP: 129/76 (12-29 @ 08:23) (77/45 - 129/76)  RR: 25 (12-29 @ 08:23) (18 - 25)  SpO2: 99% (12-29 @ 09:00) (96% - 99%)    HEIGHT/WEIGHT/BMI:   Height (cm): 152.4 (12-29), 152.4 (12-09)  Weight (kg): 80.1 (12-29), 79.4 (12-09)  BMI (kg/m2): 34.5 (12-29), 34.2 (12-09)    PHYSICAL EXAM:   GENERAL: intubated, sedated, + propofol, + bicarb drip, + pressors  HEENT: Moist mucous membranes, + OG [place]  ABDOMEN: Soft, Nontender, Nondistended, obese,   EXTREMITIES:  No clubbing, cyanosis, or edema  SKIN: warm and well perfused; No obvious rashes or lesions  IV ACCESS: right IJ TLC c/d/i  + stanley, no urine output    STANDING MEDICATIONS:   azithromycin  IVPB 500 milliGRAM(s) IV Intermittent every 24 hours  cefepime   IVPB 1000 milliGRAM(s) IV Intermittent every 12 hours  chlorhexidine 0.12% Liquid 15 milliLiter(s) Oral Mucosa every 12 hours  dextrose 50% Injectable 50 milliLiter(s) IV Push once  enoxaparin Injectable 40 milliGRAM(s) SubCutaneous every 24 hours  insulin regular  human recombinant 10 Unit(s) IV Push once  lactated ringers Bolus 500 milliLiter(s) IV Bolus once  norepinephrine Infusion 0.05 MICROgram(s)/kG/Min IV Continuous <Continuous>  propofol Infusion 20 MICROgram(s)/kG/Min IV Continuous <Continuous>    Mode: AC/ CMV (Assist Control/ Continuous Mandatory Ventilation)  RR (machine): 24  TV (machine): 400  FiO2: 100  PEEP: 8  ITime: 1  MAP: 14  PIP: 24  ABG - ( 29 Dec 2023 09:33 )  pH, Arterial: 7.26  pH, Blood: x     /  pCO2: 29    /  pO2: 185   / HCO3: 13    / Base Excess: -12.6 /  SaO2: 100.0     LABS:                         14.1   29.27 )-----------( 498      ( 29 Dec 2023 02:19 )             44.7     134<L>  |  97<L>  |  29<H>  ----------------------------<  252<H>          (12-29-23 @ 02:33)  6.7<HH>   |  18  |  1.5    Ca    9.2          (12-29-23 @ 02:33)    TPro  5.8<L>  /  Alb  3.3<L>  /  TBili  0.7  /  DBili  x   /  AST  203<H>  /  ALT  71<H>  /  AlkPhos  81       12-29-23 @ 02:33      Triglycerides, Serum:     A1c:     Blood Glucose (Past 24 hours):  334 mg/dL (12-29 @ 07:07)  228 mg/dL (12-29 @ 06:46)  265 mg/dL (12-29 @ 05:56)  280 mg/dL (12-29 @ 01:01)  342 mg/dL (12-29 @ 00:24)    DIET: NPO    RADIOLOGY:   < from: CT Abdomen and Pelvis No Cont (12.29.23 @ 05:08) >    LUNGS/PLEURA/AIRWAYS: Endotracheal tube in satisfactory position   terminating 2.5 cm abovethe sangita. Multifocal bilateral consolidations   more prominent in the lower lobes. Bibasilar consolidations may represent   combination of infection and atelectasis. Detailed assessment limited due   to respiratory motion. No pneumothorax. No pleural effusion.    MEDIASTINUM/THORACIC NODES: No thoracic lymphadenopathy.    HEART/GREAT VESSELS: Right IJ central venous catheter tip terminates at   the cavoatrial junction. Marked coronary artery calcifications. No   pericardial effusion. Aortic atherosclerosis. Normal caliber thoracic   aorta and main pulmonary artery.      ABDOMEN/PELVIS:    HEPATOBILIARY: No focal abnormality within the unenhanced liver.    SPLEEN: Few splenic calcified granulomas.    PANCREAS: Unremarkable.    ADRENAL GLANDS: Unremarkable.    KIDNEYS: Atrophic appearing bilateral kidneys. No hydronephrosis.   Bilateral nephrolithiasis including a nonobstructing right staghorn   calculus.    ABDOMINOPELVIC NODES: No definite lymphadenopathy identified.    PELVIC ORGANS: Urinary bladder decompressed with Stanley catheter. Uterus   is surgically absent.    < from: CT Abdomen and Pelvis No Cont (12.29.23 @ 05:08) >  PERITONEUM/MESENTERY/BOWEL: Enteric tube tip in the stomach. No bowel   obstruction. Colonic diverticulosis without evidence of acute   diverticulitis moderate colonic stool most notably in the right and   transverse colon may represent underlying constipation. No   pneumoperitoneum.    BONES/SOFT TISSUES: Bones are osteopenic. Degenerative changes are noted   throughout the vertebral column. Incomplete fracture through the anterior   cortex of the sternum (series 10 image 142; series 17 image 86).   Bilateral second through eighth anterior/anterolateral acute rib   fractures,, some of which are nondisplaced and some minimally displaced.    OTHER: Extensive atherosclerosis within the aorta and branch vessels. No   abdominal aortic aneurysm.  < end of copied text >

## 2023-12-29 NOTE — ED PROVIDER NOTE - CLINICAL SUMMARY MEDICAL DECISION MAKING FREE TEXT BOX
83-year-old female past medical history of hypertension, diabetes, dyslipidemia, arthritis who presents to the emergency department after choking at home and suffering a cardiac arrest.  Patient was eating with family was eating roast beef when she made a signal that she was choking.  Patient turned cyanotic and family lowered her to the floor and started ACLS.  She was given chest compressions and breaths by family and EMS was immediately called.  When EMS arrived, patient had pulses and required emergency intubation.  Patient had roast beef in her airway was successfully intubated.  Prearrival postcardiac arrest notification was given to the emergency department.    Patient rushed to the critical care trauma area.  End-tidal CO2 was normal patient had bilateral breath sounds and ET tube was confirmed.  Fingerstick taken.  Patient has good pulses initial vital signs reviewed.  Physical exam performed and patient has no signs of external trauma and has normal heart sounds.  Patient has a distended abdomen with normal bowel sounds.  Patient is breathing over ventilations but not moving extremities.  Pupils are 4 mm and reactive.  Large-bore IV access obtained and full labs sent, VBG performed, bedside chest x-ray performed and confirms tube placement.  Patient was started on propofol for sedation with fentanyl.  Initial VBG shows metabolic/respiratory acidosis patient was given IV fluids and was ventilated.  Repeat VBG shows elevated potassium to 7.3.  Patient's creatinine is normal but emergent Grant was placed and patient was given fluids and medical treatment for hyperkalemia.  Consult for renal placed and repeat EKG performed.  VBG was repeated and potassium improving to 6.3.  Patient is making small amount of urine, however, patient has an elevating lactate to 9.  Will get CT scan of the chest and abdomen pelvis.  Concern for possible ischemic colitis secondary to cardiac arrest given clinical picture.    Patient to be admitted to medical ICU for cardiac arrest.  Patient likely has aspiration pneumonia/pneumonitis, which does not require antibiotics but given elevated white blood cell count we will start broad-spectrum antibiotics.    ED work up reviewed and results and plan of care discussed with patients family. Patient requires admission for further work up, monitoring, and management. Need for admission discussed with patients family.

## 2023-12-29 NOTE — CONSULT NOTE ADULT - SUBJECTIVE AND OBJECTIVE BOX
Patient is a 83y old  Female who presents with a chief complaint of S/P Cardiac Arrest (29 Dec 2023 08:04)      HPI:  82yo female PMHx HLD, DM, HTN, arthritis presenting post cardiac arrest. Patient was found to be unconscious after an episode of coughing and chocking by family. CPR was initiated by family. Son is a PA here in the surgery department. Patient was intubated in the field by EMS.     In ED vitals were  T(F): --  HR: 89  BP: --  RR: 18  SpO2: 98%    Labs were significant for leukocytosis with WBC 29K, platelets 506, K+ 5.9,  ALT 65, BUN 24, Cr 1.1.     VBG: lactate 7.4, pH 7.11, Na+ 131, K+ 5.6,   (29 Dec 2023 01:41)      PAST MEDICAL & SURGICAL HISTORY:  HTN (hypertension)      HLD (hyperlipidemia)      DM (diabetes mellitus)      Gout      History of bilateral knee replacement          FAMILY HISTORY:  No pertinent family history in first degree relatives    :  No known cardiovacular family hisotry     ROS:  See HPI     Allergies    No Known Allergies    Intolerances          PHYSICAL EXAM    ICU Vital Signs Last 24 Hrs  T(C): 35.6 (29 Dec 2023 08:23), Max: 35.6 (29 Dec 2023 08:23)  T(F): 96 (29 Dec 2023 08:23), Max: 96 (29 Dec 2023 08:23)  HR: 78 (29 Dec 2023 08:23) (68 - 89)  BP: 129/76 (29 Dec 2023 08:23) (77/45 - 129/76)  BP(mean): 97 (29 Dec 2023 08:23) (97 - 97)  ABP: --  ABP(mean): --  RR: 25 (29 Dec 2023 08:23) (18 - 25)  SpO2: 99% (29 Dec 2023 08:23) (96% - 99%)    O2 Parameters below as of 29 Dec 2023 08:23  Patient On (Oxygen Delivery Method): ventilator    O2 Concentration (%): 100        General: Intubated, sedated   HEENT:  BRYAN              Lymphatic system: No cervical LN   Lungs: Bilateral BS, coarse   Cardiovascular: Regular  Gastrointestinal: Soft, Positive BS  Musculoskeletal: No clubbing.  No cyanosis  Skin: Warm.  Intact  Neurological: sedated         LABS:                          14.1   29.27 )-----------( 498      ( 29 Dec 2023 02:19 )             44.7                                               12    134<L>  |  97<L>  |  29<H>  ----------------------------<  252<H>  6.7<HH>   |  18  |  1.5    Ca    9.2      29 Dec 2023 02:33    TPro  5.8<L>  /  Alb  3.3<L>  /  TBili  0.7  /  DBili  x   /  AST  203<H>  /  ALT  71<H>  /  AlkPhos  81  12      PT/INR - ( 28 Dec 2023 21:50 )   PT: 12.40 sec;   INR: 1.09 ratio         PTT - ( 28 Dec 2023 21:50 )  PTT:40.7 sec                                       Urinalysis Basic - ( 29 Dec 2023 03:30 )    Color: Yellow / Appearance: Clear / S.023 / pH: x  Gluc: x / Ketone: Negative mg/dL  / Bili: Negative / Urobili: 0.2 mg/dL   Blood: x / Protein: Negative mg/dL / Nitrite: Negative   Leuk Esterase: Trace / RBC: 5 /HPF / WBC 6 /HPF   Sq Epi: x / Non Sq Epi: 2 /HPF / Bacteria: Negative /HPF                                                  LIVER FUNCTIONS - ( 29 Dec 2023 02:33 )  Alb: 3.3 g/dL / Pro: 5.8 g/dL / ALK PHOS: 81 U/L / ALT: 71 U/L / AST: 203 U/L / GGT: x                                                                                               Mode: AC/ CMV (Assist Control/ Continuous Mandatory Ventilation)  RR (machine): 20  TV (machine): 450  FiO2: 100  PEEP: 6  ITime: 1  MAP: 13  PIP: 27                                          X-Rays                                                                                     ECHO    MEDICATIONS  (STANDING):  chlorhexidine 0.12% Liquid 15 milliLiter(s) Oral Mucosa every 12 hours  norepinephrine Infusion 0.05 MICROgram(s)/kG/Min (3.98 mL/Hr) IV Continuous <Continuous>  propofol Infusion 20 MICROgram(s)/kG/Min (10.2 mL/Hr) IV Continuous <Continuous>  sodium bicarbonate  Infusion 0.176 mEq/kG/Hr (100 mL/Hr) IV Continuous <Continuous>    MEDICATIONS  (PRN):         Patient is a 83y old  Female who presents with a chief complaint of S/P Cardiac Arrest (29 Dec 2023 08:04)      HPI:  84yo female PMHx HLD, DM, HTN, arthritis presenting post cardiac arrest. Patient was found to be unconscious after an episode of coughing and chocking by family. CPR was initiated by family. Son is a PA here in the surgery department. Patient was intubated in the field by EMS.     In ED vitals were  T(F): --  HR: 89  BP: --  RR: 18  SpO2: 98%    Labs were significant for leukocytosis with WBC 29K, platelets 506, K+ 5.9,  ALT 65, BUN 24, Cr 1.1.     VBG: lactate 7.4, pH 7.11, Na+ 131, K+ 5.6,   (29 Dec 2023 01:41)      PAST MEDICAL & SURGICAL HISTORY:  HTN (hypertension)      HLD (hyperlipidemia)      DM (diabetes mellitus)      Gout      History of bilateral knee replacement          FAMILY HISTORY:  No pertinent family history in first degree relatives    :  No known cardiovacular family hisotry     ROS:  See HPI     Allergies    No Known Allergies    Intolerances          PHYSICAL EXAM    ICU Vital Signs Last 24 Hrs  T(C): 35.6 (29 Dec 2023 08:23), Max: 35.6 (29 Dec 2023 08:23)  T(F): 96 (29 Dec 2023 08:23), Max: 96 (29 Dec 2023 08:23)  HR: 78 (29 Dec 2023 08:23) (68 - 89)  BP: 129/76 (29 Dec 2023 08:23) (77/45 - 129/76)  BP(mean): 97 (29 Dec 2023 08:23) (97 - 97)  ABP: --  ABP(mean): --  RR: 25 (29 Dec 2023 08:23) (18 - 25)  SpO2: 99% (29 Dec 2023 08:23) (96% - 99%)    O2 Parameters below as of 29 Dec 2023 08:23  Patient On (Oxygen Delivery Method): ventilator    O2 Concentration (%): 100        General: Intubated, sedated   HEENT:  BRYAN              Lymphatic system: No cervical LN   Lungs: Bilateral BS, coarse   Cardiovascular: Regular  Gastrointestinal: Soft, Positive BS  Musculoskeletal: No clubbing.  No cyanosis  Skin: Warm.  Intact  Neurological: sedated         LABS:                          14.1   29.27 )-----------( 498      ( 29 Dec 2023 02:19 )             44.7                                               12    134<L>  |  97<L>  |  29<H>  ----------------------------<  252<H>  6.7<HH>   |  18  |  1.5    Ca    9.2      29 Dec 2023 02:33    TPro  5.8<L>  /  Alb  3.3<L>  /  TBili  0.7  /  DBili  x   /  AST  203<H>  /  ALT  71<H>  /  AlkPhos  81  12      PT/INR - ( 28 Dec 2023 21:50 )   PT: 12.40 sec;   INR: 1.09 ratio         PTT - ( 28 Dec 2023 21:50 )  PTT:40.7 sec                                       Urinalysis Basic - ( 29 Dec 2023 03:30 )    Color: Yellow / Appearance: Clear / S.023 / pH: x  Gluc: x / Ketone: Negative mg/dL  / Bili: Negative / Urobili: 0.2 mg/dL   Blood: x / Protein: Negative mg/dL / Nitrite: Negative   Leuk Esterase: Trace / RBC: 5 /HPF / WBC 6 /HPF   Sq Epi: x / Non Sq Epi: 2 /HPF / Bacteria: Negative /HPF                                                  LIVER FUNCTIONS - ( 29 Dec 2023 02:33 )  Alb: 3.3 g/dL / Pro: 5.8 g/dL / ALK PHOS: 81 U/L / ALT: 71 U/L / AST: 203 U/L / GGT: x                                                                                               Mode: AC/ CMV (Assist Control/ Continuous Mandatory Ventilation)  RR (machine): 20  TV (machine): 450  FiO2: 100  PEEP: 6  ITime: 1  MAP: 13  PIP: 27                                          X-Rays                                                                                     ECHO    MEDICATIONS  (STANDING):  chlorhexidine 0.12% Liquid 15 milliLiter(s) Oral Mucosa every 12 hours  norepinephrine Infusion 0.05 MICROgram(s)/kG/Min (3.98 mL/Hr) IV Continuous <Continuous>  propofol Infusion 20 MICROgram(s)/kG/Min (10.2 mL/Hr) IV Continuous <Continuous>  sodium bicarbonate  Infusion 0.176 mEq/kG/Hr (100 mL/Hr) IV Continuous <Continuous>    MEDICATIONS  (PRN):

## 2023-12-29 NOTE — ED PROVIDER NOTE - PROGRESS NOTE DETAILS
SG: Patient assessed at bedside.  Repeat VBG notable for hyperkalemia, rhythm strip on monitor notable for bradycardia with some peaked T waves.  Calcium, albuterol given.  Heart Rhythm and rate stable.  Grant placed..  Nephrology consulted due to elevated potassium.

## 2023-12-30 VITALS — OXYGEN SATURATION: 84 %

## 2023-12-30 LAB
A1C WITH ESTIMATED AVERAGE GLUCOSE RESULT: 6.5 % — HIGH (ref 4–5.6)
A1C WITH ESTIMATED AVERAGE GLUCOSE RESULT: 6.5 % — HIGH (ref 4–5.6)
ALBUMIN SERPL ELPH-MCNC: 2.6 G/DL — LOW (ref 3.5–5.2)
ALBUMIN SERPL ELPH-MCNC: 2.6 G/DL — LOW (ref 3.5–5.2)
ALP SERPL-CCNC: 157 U/L — HIGH (ref 30–115)
ALP SERPL-CCNC: 157 U/L — HIGH (ref 30–115)
ALT FLD-CCNC: 2517 U/L — HIGH (ref 0–41)
ALT FLD-CCNC: 2517 U/L — HIGH (ref 0–41)
ANION GAP SERPL CALC-SCNC: 38 MMOL/L — HIGH (ref 7–14)
ANION GAP SERPL CALC-SCNC: 38 MMOL/L — HIGH (ref 7–14)
ANION GAP SERPL CALC-SCNC: 40 MMOL/L — HIGH (ref 7–14)
ANION GAP SERPL CALC-SCNC: 41 MMOL/L — HIGH (ref 7–14)
ANION GAP SERPL CALC-SCNC: 41 MMOL/L — HIGH (ref 7–14)
AST SERPL-CCNC: 4814 U/L — HIGH (ref 0–41)
AST SERPL-CCNC: 4814 U/L — HIGH (ref 0–41)
BASE EXCESS BLDA CALC-SCNC: -27.4 MMOL/L — LOW (ref -2–3)
BASE EXCESS BLDA CALC-SCNC: -27.4 MMOL/L — LOW (ref -2–3)
BASOPHILS # BLD AUTO: 0.1 K/UL — SIGNIFICANT CHANGE UP (ref 0–0.2)
BASOPHILS # BLD AUTO: 0.1 K/UL — SIGNIFICANT CHANGE UP (ref 0–0.2)
BASOPHILS NFR BLD AUTO: 0.4 % — SIGNIFICANT CHANGE UP (ref 0–1)
BASOPHILS NFR BLD AUTO: 0.4 % — SIGNIFICANT CHANGE UP (ref 0–1)
BILIRUB SERPL-MCNC: 0.9 MG/DL — SIGNIFICANT CHANGE UP (ref 0.2–1.2)
BILIRUB SERPL-MCNC: 0.9 MG/DL — SIGNIFICANT CHANGE UP (ref 0.2–1.2)
BUN SERPL-MCNC: 38 MG/DL — HIGH (ref 10–20)
BUN SERPL-MCNC: 38 MG/DL — HIGH (ref 10–20)
BUN SERPL-MCNC: 40 MG/DL — HIGH (ref 10–20)
BUN SERPL-MCNC: 41 MG/DL — HIGH (ref 10–20)
BUN SERPL-MCNC: 41 MG/DL — HIGH (ref 10–20)
CALCIUM SERPL-MCNC: 11 MG/DL — HIGH (ref 8.4–10.5)
CALCIUM SERPL-MCNC: 11 MG/DL — HIGH (ref 8.4–10.5)
CALCIUM SERPL-MCNC: 7.1 MG/DL — LOW (ref 8.4–10.5)
CALCIUM SERPL-MCNC: 7.1 MG/DL — LOW (ref 8.4–10.5)
CALCIUM SERPL-MCNC: 7.9 MG/DL — LOW (ref 8.4–10.5)
CALCIUM SERPL-MCNC: 7.9 MG/DL — LOW (ref 8.4–10.5)
CALCIUM SERPL-MCNC: 8.6 MG/DL — SIGNIFICANT CHANGE UP (ref 8.4–10.5)
CALCIUM SERPL-MCNC: 8.6 MG/DL — SIGNIFICANT CHANGE UP (ref 8.4–10.5)
CHLORIDE SERPL-SCNC: 91 MMOL/L — LOW (ref 98–110)
CHLORIDE SERPL-SCNC: 92 MMOL/L — LOW (ref 98–110)
CHLORIDE SERPL-SCNC: 92 MMOL/L — LOW (ref 98–110)
CHLORIDE SERPL-SCNC: 95 MMOL/L — LOW (ref 98–110)
CHLORIDE SERPL-SCNC: 95 MMOL/L — LOW (ref 98–110)
CO2 SERPL-SCNC: 4 MMOL/L — CRITICAL LOW (ref 17–32)
CO2 SERPL-SCNC: 4 MMOL/L — CRITICAL LOW (ref 17–32)
CO2 SERPL-SCNC: 6 MMOL/L — CRITICAL LOW (ref 17–32)
CO2 SERPL-SCNC: 6 MMOL/L — CRITICAL LOW (ref 17–32)
CO2 SERPL-SCNC: 7 MMOL/L — CRITICAL LOW (ref 17–32)
CREAT SERPL-MCNC: 2.7 MG/DL — HIGH (ref 0.7–1.5)
CREAT SERPL-MCNC: 2.7 MG/DL — HIGH (ref 0.7–1.5)
CREAT SERPL-MCNC: 2.8 MG/DL — HIGH (ref 0.7–1.5)
CREAT SERPL-MCNC: 2.8 MG/DL — HIGH (ref 0.7–1.5)
CREAT SERPL-MCNC: 3.1 MG/DL — HIGH (ref 0.7–1.5)
CREAT SERPL-MCNC: 3.1 MG/DL — HIGH (ref 0.7–1.5)
CREAT SERPL-MCNC: 3.3 MG/DL — HIGH (ref 0.7–1.5)
CREAT SERPL-MCNC: 3.3 MG/DL — HIGH (ref 0.7–1.5)
EGFR: 14 ML/MIN/1.73M2 — LOW
EGFR: 14 ML/MIN/1.73M2 — LOW
EGFR: 15 ML/MIN/1.73M2 — LOW
EGFR: 15 ML/MIN/1.73M2 — LOW
EGFR: 16 ML/MIN/1.73M2 — LOW
EGFR: 16 ML/MIN/1.73M2 — LOW
EGFR: 17 ML/MIN/1.73M2 — LOW
EGFR: 17 ML/MIN/1.73M2 — LOW
EOSINOPHIL # BLD AUTO: 0.03 K/UL — SIGNIFICANT CHANGE UP (ref 0–0.7)
EOSINOPHIL # BLD AUTO: 0.03 K/UL — SIGNIFICANT CHANGE UP (ref 0–0.7)
EOSINOPHIL NFR BLD AUTO: 0.1 % — SIGNIFICANT CHANGE UP (ref 0–8)
EOSINOPHIL NFR BLD AUTO: 0.1 % — SIGNIFICANT CHANGE UP (ref 0–8)
ESTIMATED AVERAGE GLUCOSE: 140 MG/DL — HIGH (ref 68–114)
ESTIMATED AVERAGE GLUCOSE: 140 MG/DL — HIGH (ref 68–114)
GAS PNL BLDA: SIGNIFICANT CHANGE UP
GAS PNL BLDA: SIGNIFICANT CHANGE UP
GLUCOSE BLDC GLUCOMTR-MCNC: 109 MG/DL — HIGH (ref 70–99)
GLUCOSE BLDC GLUCOMTR-MCNC: 109 MG/DL — HIGH (ref 70–99)
GLUCOSE BLDC GLUCOMTR-MCNC: 132 MG/DL — HIGH (ref 70–99)
GLUCOSE BLDC GLUCOMTR-MCNC: 132 MG/DL — HIGH (ref 70–99)
GLUCOSE BLDC GLUCOMTR-MCNC: 170 MG/DL — HIGH (ref 70–99)
GLUCOSE BLDC GLUCOMTR-MCNC: 71 MG/DL — SIGNIFICANT CHANGE UP (ref 70–99)
GLUCOSE BLDC GLUCOMTR-MCNC: 71 MG/DL — SIGNIFICANT CHANGE UP (ref 70–99)
GLUCOSE SERPL-MCNC: 120 MG/DL — HIGH (ref 70–99)
GLUCOSE SERPL-MCNC: 120 MG/DL — HIGH (ref 70–99)
GLUCOSE SERPL-MCNC: 166 MG/DL — HIGH (ref 70–99)
GLUCOSE SERPL-MCNC: 166 MG/DL — HIGH (ref 70–99)
GLUCOSE SERPL-MCNC: 187 MG/DL — HIGH (ref 70–99)
GLUCOSE SERPL-MCNC: 187 MG/DL — HIGH (ref 70–99)
GLUCOSE SERPL-MCNC: 69 MG/DL — LOW (ref 70–99)
GLUCOSE SERPL-MCNC: 69 MG/DL — LOW (ref 70–99)
HBV CORE AB SER-ACNC: SIGNIFICANT CHANGE UP
HBV CORE AB SER-ACNC: SIGNIFICANT CHANGE UP
HBV SURFACE AB SER-ACNC: <3 MIU/ML — LOW
HBV SURFACE AB SER-ACNC: SIGNIFICANT CHANGE UP
HBV SURFACE AG SER-ACNC: SIGNIFICANT CHANGE UP
HBV SURFACE AG SER-ACNC: SIGNIFICANT CHANGE UP
HCO3 BLDA-SCNC: 5 MMOL/L — CRITICAL LOW (ref 21–28)
HCO3 BLDA-SCNC: 5 MMOL/L — CRITICAL LOW (ref 21–28)
HCT VFR BLD CALC: 38.1 % — SIGNIFICANT CHANGE UP (ref 37–47)
HCT VFR BLD CALC: 38.1 % — SIGNIFICANT CHANGE UP (ref 37–47)
HCV AB S/CO SERPL IA: 0.04 COI — SIGNIFICANT CHANGE UP
HCV AB S/CO SERPL IA: 0.04 COI — SIGNIFICANT CHANGE UP
HCV AB SERPL-IMP: SIGNIFICANT CHANGE UP
HCV AB SERPL-IMP: SIGNIFICANT CHANGE UP
HGB BLD-MCNC: 11.2 G/DL — LOW (ref 12–16)
HGB BLD-MCNC: 11.2 G/DL — LOW (ref 12–16)
HOROWITZ INDEX BLDA+IHG-RTO: 60 — SIGNIFICANT CHANGE UP
HOROWITZ INDEX BLDA+IHG-RTO: 60 — SIGNIFICANT CHANGE UP
IMM GRANULOCYTES NFR BLD AUTO: 2.7 % — HIGH (ref 0.1–0.3)
IMM GRANULOCYTES NFR BLD AUTO: 2.7 % — HIGH (ref 0.1–0.3)
LEGIONELLA AG UR QL: NEGATIVE — SIGNIFICANT CHANGE UP
LEGIONELLA AG UR QL: NEGATIVE — SIGNIFICANT CHANGE UP
LYMPHOCYTES # BLD AUTO: 23.3 % — SIGNIFICANT CHANGE UP (ref 20.5–51.1)
LYMPHOCYTES # BLD AUTO: 23.3 % — SIGNIFICANT CHANGE UP (ref 20.5–51.1)
LYMPHOCYTES # BLD AUTO: 6.14 K/UL — HIGH (ref 1.2–3.4)
LYMPHOCYTES # BLD AUTO: 6.14 K/UL — HIGH (ref 1.2–3.4)
MAGNESIUM SERPL-MCNC: 2.1 MG/DL — SIGNIFICANT CHANGE UP (ref 1.8–2.4)
MAGNESIUM SERPL-MCNC: 2.1 MG/DL — SIGNIFICANT CHANGE UP (ref 1.8–2.4)
MAGNESIUM SERPL-MCNC: 2.2 MG/DL — SIGNIFICANT CHANGE UP (ref 1.8–2.4)
MAGNESIUM SERPL-MCNC: 2.2 MG/DL — SIGNIFICANT CHANGE UP (ref 1.8–2.4)
MCHC RBC-ENTMCNC: 27.3 PG — SIGNIFICANT CHANGE UP (ref 27–31)
MCHC RBC-ENTMCNC: 27.3 PG — SIGNIFICANT CHANGE UP (ref 27–31)
MCHC RBC-ENTMCNC: 29.4 G/DL — LOW (ref 32–37)
MCHC RBC-ENTMCNC: 29.4 G/DL — LOW (ref 32–37)
MCV RBC AUTO: 92.9 FL — SIGNIFICANT CHANGE UP (ref 81–99)
MCV RBC AUTO: 92.9 FL — SIGNIFICANT CHANGE UP (ref 81–99)
MONOCYTES # BLD AUTO: 0.95 K/UL — HIGH (ref 0.1–0.6)
MONOCYTES # BLD AUTO: 0.95 K/UL — HIGH (ref 0.1–0.6)
MONOCYTES NFR BLD AUTO: 3.6 % — SIGNIFICANT CHANGE UP (ref 1.7–9.3)
MONOCYTES NFR BLD AUTO: 3.6 % — SIGNIFICANT CHANGE UP (ref 1.7–9.3)
NEUTROPHILS # BLD AUTO: 18.41 K/UL — HIGH (ref 1.4–6.5)
NEUTROPHILS # BLD AUTO: 18.41 K/UL — HIGH (ref 1.4–6.5)
NEUTROPHILS NFR BLD AUTO: 69.9 % — SIGNIFICANT CHANGE UP (ref 42.2–75.2)
NEUTROPHILS NFR BLD AUTO: 69.9 % — SIGNIFICANT CHANGE UP (ref 42.2–75.2)
NRBC # BLD: 0 /100 WBCS — SIGNIFICANT CHANGE UP (ref 0–0)
NRBC # BLD: 0 /100 WBCS — SIGNIFICANT CHANGE UP (ref 0–0)
PCO2 BLDA: 29 MMHG — LOW (ref 32–45)
PCO2 BLDA: 29 MMHG — LOW (ref 32–45)
PH BLDA: <6.9 — CRITICAL LOW (ref 7.35–7.45)
PH BLDA: <6.9 — CRITICAL LOW (ref 7.35–7.45)
PLATELET # BLD AUTO: 326 K/UL — SIGNIFICANT CHANGE UP (ref 130–400)
PLATELET # BLD AUTO: 326 K/UL — SIGNIFICANT CHANGE UP (ref 130–400)
PMV BLD: 11.9 FL — HIGH (ref 7.4–10.4)
PMV BLD: 11.9 FL — HIGH (ref 7.4–10.4)
PO2 BLDA: 141 MMHG — HIGH (ref 83–108)
PO2 BLDA: 141 MMHG — HIGH (ref 83–108)
POTASSIUM SERPL-MCNC: 5.9 MMOL/L — HIGH (ref 3.5–5)
POTASSIUM SERPL-MCNC: 5.9 MMOL/L — HIGH (ref 3.5–5)
POTASSIUM SERPL-MCNC: 6.1 MMOL/L — CRITICAL HIGH (ref 3.5–5)
POTASSIUM SERPL-MCNC: 6.1 MMOL/L — CRITICAL HIGH (ref 3.5–5)
POTASSIUM SERPL-MCNC: 6.8 MMOL/L — CRITICAL HIGH (ref 3.5–5)
POTASSIUM SERPL-MCNC: 6.8 MMOL/L — CRITICAL HIGH (ref 3.5–5)
POTASSIUM SERPL-MCNC: SIGNIFICANT CHANGE UP MMOL/L (ref 3.5–5)
POTASSIUM SERPL-MCNC: SIGNIFICANT CHANGE UP MMOL/L (ref 3.5–5)
POTASSIUM SERPL-SCNC: 5.9 MMOL/L — HIGH (ref 3.5–5)
POTASSIUM SERPL-SCNC: 5.9 MMOL/L — HIGH (ref 3.5–5)
POTASSIUM SERPL-SCNC: 6.1 MMOL/L — CRITICAL HIGH (ref 3.5–5)
POTASSIUM SERPL-SCNC: 6.1 MMOL/L — CRITICAL HIGH (ref 3.5–5)
POTASSIUM SERPL-SCNC: 6.8 MMOL/L — CRITICAL HIGH (ref 3.5–5)
POTASSIUM SERPL-SCNC: 6.8 MMOL/L — CRITICAL HIGH (ref 3.5–5)
POTASSIUM SERPL-SCNC: SIGNIFICANT CHANGE UP MMOL/L (ref 3.5–5)
POTASSIUM SERPL-SCNC: SIGNIFICANT CHANGE UP MMOL/L (ref 3.5–5)
PROCALCITONIN SERPL-MCNC: 1.65 NG/ML — HIGH (ref 0.02–0.1)
PROCALCITONIN SERPL-MCNC: 1.65 NG/ML — HIGH (ref 0.02–0.1)
PROT SERPL-MCNC: 4.8 G/DL — LOW (ref 6–8)
PROT SERPL-MCNC: 4.8 G/DL — LOW (ref 6–8)
RBC # BLD: 4.1 M/UL — LOW (ref 4.2–5.4)
RBC # BLD: 4.1 M/UL — LOW (ref 4.2–5.4)
RBC # FLD: 15.5 % — HIGH (ref 11.5–14.5)
RBC # FLD: 15.5 % — HIGH (ref 11.5–14.5)
SAO2 % BLDA: 96 % — SIGNIFICANT CHANGE UP (ref 94–98)
SAO2 % BLDA: 96 % — SIGNIFICANT CHANGE UP (ref 94–98)
SODIUM SERPL-SCNC: 136 MMOL/L — SIGNIFICANT CHANGE UP (ref 135–146)
SODIUM SERPL-SCNC: 136 MMOL/L — SIGNIFICANT CHANGE UP (ref 135–146)
SODIUM SERPL-SCNC: 138 MMOL/L — SIGNIFICANT CHANGE UP (ref 135–146)
SODIUM SERPL-SCNC: 138 MMOL/L — SIGNIFICANT CHANGE UP (ref 135–146)
SODIUM SERPL-SCNC: 139 MMOL/L — SIGNIFICANT CHANGE UP (ref 135–146)
WBC # BLD: 26.34 K/UL — HIGH (ref 4.8–10.8)
WBC # BLD: 26.34 K/UL — HIGH (ref 4.8–10.8)
WBC # FLD AUTO: 26.34 K/UL — HIGH (ref 4.8–10.8)
WBC # FLD AUTO: 26.34 K/UL — HIGH (ref 4.8–10.8)

## 2023-12-30 PROCEDURE — 71045 X-RAY EXAM CHEST 1 VIEW: CPT | Mod: 26

## 2023-12-30 PROCEDURE — 93010 ELECTROCARDIOGRAM REPORT: CPT

## 2023-12-30 PROCEDURE — 99291 CRITICAL CARE FIRST HOUR: CPT

## 2023-12-30 RX ORDER — SODIUM BICARBONATE 1 MEQ/ML
0.28 SYRINGE (ML) INTRAVENOUS
Qty: 150 | Refills: 0 | Status: DISCONTINUED | OUTPATIENT
Start: 2023-12-30 | End: 2023-12-30

## 2023-12-30 RX ORDER — LACTULOSE 10 G/15ML
30 SOLUTION ORAL
Refills: 0 | Status: DISCONTINUED | OUTPATIENT
Start: 2023-12-30 | End: 2023-12-30

## 2023-12-30 RX ORDER — VANCOMYCIN HCL 1 G
1000 VIAL (EA) INTRAVENOUS EVERY 12 HOURS
Refills: 0 | Status: DISCONTINUED | OUTPATIENT
Start: 2023-12-30 | End: 2023-12-30

## 2023-12-30 RX ORDER — CHLORHEXIDINE GLUCONATE 213 G/1000ML
1 SOLUTION TOPICAL
Refills: 0 | Status: DISCONTINUED | OUTPATIENT
Start: 2023-12-30 | End: 2023-12-30

## 2023-12-30 RX ORDER — VANCOMYCIN HCL 1 G
1000 VIAL (EA) INTRAVENOUS ONCE
Refills: 0 | Status: COMPLETED | OUTPATIENT
Start: 2023-12-30 | End: 2023-12-30

## 2023-12-30 RX ORDER — NOREPINEPHRINE BITARTRATE/D5W 8 MG/250ML
0.05 PLASTIC BAG, INJECTION (ML) INTRAVENOUS
Qty: 32 | Refills: 0 | Status: DISCONTINUED | OUTPATIENT
Start: 2023-12-30 | End: 2023-12-30

## 2023-12-30 RX ORDER — BUMETANIDE 0.25 MG/ML
1 INJECTION INTRAMUSCULAR; INTRAVENOUS ONCE
Refills: 0 | Status: DISCONTINUED | OUTPATIENT
Start: 2023-12-30 | End: 2023-12-30

## 2023-12-30 RX ORDER — SODIUM BICARBONATE 1 MEQ/ML
50 SYRINGE (ML) INTRAVENOUS ONCE
Refills: 0 | Status: COMPLETED | OUTPATIENT
Start: 2023-12-30 | End: 2023-12-30

## 2023-12-30 RX ORDER — LACTULOSE 10 G/15ML
30 SOLUTION ORAL ONCE
Refills: 0 | Status: DISCONTINUED | OUTPATIENT
Start: 2023-12-30 | End: 2023-12-30

## 2023-12-30 RX ORDER — MUPIROCIN 20 MG/G
1 OINTMENT TOPICAL
Refills: 0 | Status: DISCONTINUED | OUTPATIENT
Start: 2023-12-30 | End: 2023-12-30

## 2023-12-30 RX ORDER — CEFEPIME 1 G/1
2000 INJECTION, POWDER, FOR SOLUTION INTRAMUSCULAR; INTRAVENOUS EVERY 12 HOURS
Refills: 0 | Status: DISCONTINUED | OUTPATIENT
Start: 2023-12-30 | End: 2023-12-30

## 2023-12-30 RX ORDER — VASOPRESSIN 20 [USP'U]/ML
0.04 INJECTION INTRAVENOUS
Qty: 40 | Refills: 0 | Status: DISCONTINUED | OUTPATIENT
Start: 2023-12-30 | End: 2023-12-30

## 2023-12-30 RX ORDER — SENNA PLUS 8.6 MG/1
2 TABLET ORAL AT BEDTIME
Refills: 0 | Status: DISCONTINUED | OUTPATIENT
Start: 2023-12-30 | End: 2023-12-30

## 2023-12-30 RX ORDER — FUROSEMIDE 40 MG
40 TABLET ORAL ONCE
Refills: 0 | Status: COMPLETED | OUTPATIENT
Start: 2023-12-30 | End: 2023-12-30

## 2023-12-30 RX ORDER — CALCIUM GLUCONATE 100 MG/ML
2 VIAL (ML) INTRAVENOUS ONCE
Refills: 0 | Status: COMPLETED | OUTPATIENT
Start: 2023-12-30 | End: 2023-12-30

## 2023-12-30 RX ORDER — CALCIUM GLUCONATE 100 MG/ML
1 VIAL (ML) INTRAVENOUS ONCE
Refills: 0 | Status: COMPLETED | OUTPATIENT
Start: 2023-12-30 | End: 2023-12-30

## 2023-12-30 RX ORDER — VANCOMYCIN HCL 1 G
1000 VIAL (EA) INTRAVENOUS ONCE
Refills: 0 | Status: DISCONTINUED | OUTPATIENT
Start: 2023-12-30 | End: 2023-12-30

## 2023-12-30 RX ORDER — INSULIN HUMAN 100 [IU]/ML
10 INJECTION, SOLUTION SUBCUTANEOUS ONCE
Refills: 0 | Status: COMPLETED | OUTPATIENT
Start: 2023-12-30 | End: 2023-12-30

## 2023-12-30 RX ORDER — DEXTROSE 50 % IN WATER 50 %
50 SYRINGE (ML) INTRAVENOUS ONCE
Refills: 0 | Status: COMPLETED | OUTPATIENT
Start: 2023-12-30 | End: 2023-12-30

## 2023-12-30 RX ADMIN — Medication 81 MILLIGRAM(S): at 11:34

## 2023-12-30 RX ADMIN — QUETIAPINE FUMARATE 25 MILLIGRAM(S): 200 TABLET, FILM COATED ORAL at 11:33

## 2023-12-30 RX ADMIN — MUPIROCIN 1 APPLICATION(S): 20 OINTMENT TOPICAL at 06:14

## 2023-12-30 RX ADMIN — LACTULOSE 30 GRAM(S): 10 SOLUTION ORAL at 06:15

## 2023-12-30 RX ADMIN — Medication 50 MILLILITER(S): at 01:15

## 2023-12-30 RX ADMIN — LACTULOSE 30 GRAM(S): 10 SOLUTION ORAL at 07:43

## 2023-12-30 RX ADMIN — VASOPRESSIN 6 UNIT(S)/MIN: 20 INJECTION INTRAVENOUS at 07:25

## 2023-12-30 RX ADMIN — CHLORHEXIDINE GLUCONATE 1 APPLICATION(S): 213 SOLUTION TOPICAL at 11:36

## 2023-12-30 RX ADMIN — Medication 150 MEQ/KG/HR: at 10:36

## 2023-12-30 RX ADMIN — Medication 50 MILLIEQUIVALENT(S): at 02:51

## 2023-12-30 RX ADMIN — Medication 100 GRAM(S): at 00:42

## 2023-12-30 RX ADMIN — LACTULOSE 30 GRAM(S): 10 SOLUTION ORAL at 14:07

## 2023-12-30 RX ADMIN — LACTULOSE 30 GRAM(S): 10 SOLUTION ORAL at 10:21

## 2023-12-30 RX ADMIN — Medication 150 MEQ/KG/HR: at 13:14

## 2023-12-30 RX ADMIN — CHLORHEXIDINE GLUCONATE 15 MILLILITER(S): 213 SOLUTION TOPICAL at 06:15

## 2023-12-30 RX ADMIN — Medication 50 MEQ/KG/HR: at 02:57

## 2023-12-30 RX ADMIN — AZITHROMYCIN 255 MILLIGRAM(S): 500 TABLET, FILM COATED ORAL at 11:33

## 2023-12-30 RX ADMIN — LACTULOSE 30 GRAM(S): 10 SOLUTION ORAL at 01:16

## 2023-12-30 RX ADMIN — Medication 3.76 MICROGRAM(S)/KG/MIN: at 07:25

## 2023-12-30 RX ADMIN — Medication 40 MILLIGRAM(S): at 01:15

## 2023-12-30 RX ADMIN — CEFEPIME 100 MILLIGRAM(S): 1 INJECTION, POWDER, FOR SOLUTION INTRAMUSCULAR; INTRAVENOUS at 06:15

## 2023-12-30 RX ADMIN — Medication 50 MILLIEQUIVALENT(S): at 03:16

## 2023-12-30 RX ADMIN — Medication 3.98 MICROGRAM(S)/KG/MIN: at 04:25

## 2023-12-30 RX ADMIN — SODIUM ZIRCONIUM CYCLOSILICATE 10 GRAM(S): 10 POWDER, FOR SUSPENSION ORAL at 07:43

## 2023-12-30 RX ADMIN — Medication 200 GRAM(S): at 13:13

## 2023-12-30 RX ADMIN — LACTULOSE 30 GRAM(S): 10 SOLUTION ORAL at 11:33

## 2023-12-30 RX ADMIN — INSULIN HUMAN 10 UNIT(S): 100 INJECTION, SOLUTION SUBCUTANEOUS at 01:15

## 2023-12-30 RX ADMIN — ENOXAPARIN SODIUM 40 MILLIGRAM(S): 100 INJECTION SUBCUTANEOUS at 10:21

## 2023-12-30 RX ADMIN — Medication 250 MILLIGRAM(S): at 04:24

## 2023-12-30 NOTE — PROGRESS NOTE ADULT - ASSESSMENT
84yo female PMHx HLD, DM, HTN, arthritis presenting post cardiac arrest. Patient was found to be unconscious after choking on food. CPR was initiated by family. Son is a PA here in the surgery department. Patient was intubated in the field by EMS Nephrology was reached for DEYSI and hyperkalemia. Spoke to the daughter of the patient and she wanted all interventions for the patient     Oliguric ATN/ Hyperkalemia/ S/p cardiac arrest / septic shock  MRSA PCR+  On 2 pressors now, raising pressor requirmennts  Started on Vanco 1 gram given  Oliguric, hyperkalemia  6.8 overnight after HD for 2 hours yesterday  Profound met acidosis, rising lactate, shock liver   cont CVVH  no UF  Cont Abx Vanco 1 gram q 12hr - monitor vanco random level keep 15-20  Cont Cefepime 2 gr q12 during CVVHD  - Cr 1.56 on admission ->3.3; creat 1.2 in 9/2021 c/w CKD 3b  Overall prognosis is very poor

## 2023-12-30 NOTE — DISCHARGE NOTE FOR THE EXPIRED PATIENT - HOSPITAL COURSE
82yo female PMHx HLD, DM, HTN, arthritis presenting post cardiac arrest.   Patient was found to be unconscious after an episode of coughing and chocking by family.   CPR was initiated by family and ROSC seems to have been achieved before EMS arrived, as no CPR was required en route or when she presented to the ED.  Family does not report that the patient had any dysphagia events from before, no shortness of breath, chest pain, focal neurologic symptoms, abdominal pain, changes in bowel/ urinary habits, or constitutional symptoms.    In ED, patient had a BP in the 80s, HR=80, afebrile.  Labs were significant for leukocytosis with WBC 29K, platelets 506, K+ 5.9,  ALT 65, BUN 24, Cr 1.1.   VBG: lactate 7.4, pH 7.11, Na+ 131, K+ 5.6,      A CT scan of the chest was done in the ED showed bilateral bibasilar opacities, no abdominopelvic pathology.    Patient will be admitted to ICU for management.      · Assessment    Cardio pulmonary arrest   Episode of choking - aspiration pneumonia   Acute kidney injury   Hyperkalemia   Lactic acidosis   Shock - septic?    PLAN:    CNS: Sedated now with propofol. Appears comfortable. Daily SAT, hold Lyrica and Pristiq, c/w seroquel    HEENT: Oral care; ETT care.     PULMONARY:  HOB @ 45 degrees.  Vent changes as follows: 400/24/100%/8.    CARDIOVASCULAR: Wean off Levophed. Bedside POCUS. Repeat troponin. EKG noted. BNP. Echocardiogram.  POCUS with collapsible IVC. LR 500cc bolus and will maintain on bicarbonate drip. Hold enalapril    GI: GI prophylaxis. Place OGT. Place to suction.    RENAL:  Follow up lytes.  Correct as needed. DEYSI. Insulin and D50. Lokelma. Trend lactate. No hydro on CT. Bicarbonate drip. Might need HD and placement of a [place]    INFECTIOUS DISEASE: Follow up cultures: blood cultures, UA, DTA culture, procal. Start Cefepime and azithromycin. Nasal MRSA.     HEMATOLOGICAL:  DVT prophylaxis with Lovenox 40 mg qday. Duplex LEs.     ENDOCRINE:  Follow up FS.  Insulin protocol if needed.    MUSCULOSKELETAL: bedrest.     Needs ICU care.      84yo female PMHx HLD, DM, HTN, arthritis presenting post cardiac arrest.   Patient was found to be unconscious after an episode of coughing and chocking by family.   CPR was initiated by family and ROSC seems to have been achieved before EMS arrived, as no CPR was required en route or when she presented to the ED.  Family does not report that the patient had any dysphagia events from before, no shortness of breath, chest pain, focal neurologic symptoms, abdominal pain, changes in bowel/ urinary habits, or constitutional symptoms.    In ED, patient had a BP in the 80s, HR=80, afebrile.  Labs were significant for leukocytosis with WBC 29K, platelets 506, K+ 5.9,  ALT 65, BUN 24, Cr 1.1.   VBG: lactate 7.4, pH 7.11, Na+ 131, K+ 5.6,      A CT scan of the chest was done in the ED showed bilateral bibasilar opacities, no abdominopelvic pathology.    Patient will be admitted to ICU for management.      · Assessment    Cardio pulmonary arrest   Episode of choking - aspiration pneumonia   Acute kidney injury   Hyperkalemia   Lactic acidosis   Shock - septic?    PLAN:    CNS: Sedated now with propofol. Appears comfortable. Daily SAT, hold Lyrica and Pristiq, c/w seroquel    HEENT: Oral care; ETT care.     PULMONARY:  HOB @ 45 degrees.  Vent changes as follows: 400/24/100%/8.    CARDIOVASCULAR: Wean off Levophed. Bedside POCUS. Repeat troponin. EKG noted. BNP. Echocardiogram.  POCUS with collapsible IVC. LR 500cc bolus and will maintain on bicarbonate drip. Hold enalapril    GI: GI prophylaxis. Place OGT. Place to suction.    RENAL:  Follow up lytes.  Correct as needed. DEYSI. Insulin and D50. Lokelma. Trend lactate. No hydro on CT. Bicarbonate drip. Might need HD and placement of a [place]    INFECTIOUS DISEASE: Follow up cultures: blood cultures, UA, DTA culture, procal. Start Cefepime and azithromycin. Nasal MRSA.     HEMATOLOGICAL:  DVT prophylaxis with Lovenox 40 mg qday. Duplex LEs.     ENDOCRINE:  Follow up FS.  Insulin protocol if needed.    MUSCULOSKELETAL: bedrest.     Needs ICU care.      Case of 82 yo female PMHx HLD, DM, HTN, arthritis presenting post cardiac arrest.   Patient was found to be unconscious after an episode of coughing and chocking by family.   CPR was initiated by family and ROSC seems to have been achieved before EMS arrived, as no CPR was required en route or when she presented to the ED.  Family does not report that the patient had any dysphagia events from before, no shortness of breath, chest pain, focal neurologic symptoms, abdominal pain, changes in bowel/ urinary habits, or constitutional symptoms.    In ED, patient had a BP in the 80s, HR=80, afebrile.  Labs were significant for leukocytosis with WBC 29K, platelets 506, K+ 5.9,  ALT 65, BUN 24, Cr 1.1.   VBG: lactate 7.4, pH 7.11, Na+ 131, K+ 5.6,      A CT scan of the chest was done in the ED showed bilateral bibasilar opacities, no abdominopelvic pathology.    Patient admitted to ICU, had anoxic brain injury  Kidney and liver failure.  Patient was started on HD in the ICU, and she was mechanically ventilated.  She developed refractory metabolic acidosis.  Goals of care were discussed with family and they opted for DNR.  Patient had a cardiac arrest and passed away at 14:16

## 2023-12-30 NOTE — CHART NOTE - NSCHARTNOTEFT_GEN_A_CORE
CVVHD has not been working since 10 am - [place] malfunctioning  Bicarb down to 4 and K 6.1 at 11 am    The femoral [place] will be exchanged   Will do HD for 2 hrs and cont with CVVHD aftetr  Please run Heparin drip via peripheral line to avoid clothing  Obtain KUB - high lactate due to sepsis vs bowel ischemia  Prognosis remains extremely poor CVVHD has not been working since 10 am - [place] malfunctioning  Bicarb down to 4 and K 6.1 at 11 am    The femoral [place] will be exchanged   Will do HD for 2 hrs and cont with CVVHD aftetr  Please run Heparin drip via peripheral line to avoid clothing  Obtain KUB - high lactate due to sepsis vs bowel ischemia  Prognosis remains extremely poor

## 2023-12-30 NOTE — CHART NOTE - NSCHARTNOTEFT_GEN_A_CORE
Resident called for Bradycardia on monitor.   HR checked manually was in high 70s.   ECG stat ordered showing prolonged [place] interval (1st degree AV block).   STAT BMP sent.   Calcium gluconate, insulin, dextrose given as 1 time dosing each.   Lasix 40mg IV once given  Lactulose Q2H ordered until BM. Resident called for Bradycardia on monitor.   HR checked manually was in high 70s.   ECG stat ordered showing prolonged [place] interval (1st degree AV block).   STAT BMP sent.   Calcium gluconate, insulin, dextrose given as 1 time dosing each.   Lasix 40mg IV once given  Lactulose Q2H ordered until BM. Resident called for Bradycardia on monitor.   HR checked manually was in high 70s.   ECG stat ordered showing prolonged [place] interval (1st degree AV block).   STAT BMP sent.   Calcium gluconate, insulin, dextrose given as 1 time dosing each.   Lasix 40mg IV once given  Lactulose Q2H ordered until BM.    Follow up:  ABGs done Showing pH 6.8, K of 6.8, la  HCO3 on BMP is 6; repeat BMP ordered for hemolyzed sample    2 of 50meq HCO3 pushes given  Bicarb drip resumed at 75cc/H  Nephro contacted for urgent dialysis. Will start CVVH. Resident called for Bradycardia on monitor.   HR checked manually was in high 70s.   ECG stat ordered showing prolonged [place] interval (1st degree AV block).   STAT BMP sent.   Calcium gluconate, insulin, dextrose given as 1 time dosing each.   Lasix 40mg IV once given  Lactulose Q2H ordered until BM.    Follow up:  ABGs done Showing pH 6.8, K of 6.8, la  HCO3 on BMP is 6; repeat BMP ordered for hemolyzed sample    2 of 50meq HCO3 pushes given  Bicarb drip resumed at 75cc/H  Nephro contacted for urgent dialysis. Will start CVVH. Resident called for Bradycardia on monitor.   HR checked manually was in high 70s.   ECG stat ordered showing prolonged [place] interval (1st degree AV block).   STAT BMP sent.   Calcium gluconate, insulin, dextrose given as 1 time dosing each.   Lasix 40mg IV once given  Lactulose Q2H ordered until BM.    Follow up:  ABGs done Showing pH 6.8, K of 6.8, lactic acid of 20   HCO3 on BMP is 6; repeat BMP ordered for hemolyzed sample which confirmed the above numbers    2 of 50meq HCO3 pushes given stat   Bicarb drip resumed at 150cc/Hr  Nephro contacted for urgent dialysis. Per Nephro will start patient on CVVH and give one time dose of stat 1 g vancomycin. Resident called for Bradycardia on monitor.   HR checked manually was in high 70s.   ECG stat ordered showing prolonged [place] interval (1st degree AV block).   STAT BMP sent.   Calcium gluconate, insulin, dextrose given as 1 time dosing each.   Lasix 40mg IV once given  Lactulose Q2H ordered until BM.    Follow up:  ABGs done Showing pH 6.8, K of 6.8, lactic acid of 20   HCO3 on BMP is 6; repeat BMP ordered for hemolyzed sample which confirmed the above numbers    2 of 50meq HCO3 pushes given stat   Bicarb drip resumed at 150cc/Hr  Nephro contacted for urgent dialysis. Per Nephro will start patient on CVVH and give one time dose of stat 1 g vancomycin. Resident called for Bradycardia on monitor.   HR checked manually was in high 70s.   ECG stat ordered showing prolonged [place] interval (1st degree AV block).   STAT BMP sent.   Calcium gluconate, insulin, dextrose given as 1 time dosing each.   Lasix 40mg IV once given  Lactulose Q2H ordered until BM.    Follow up:  ABGs done Showing pH 6.8, K of 6.8, lactic acid of 20   HCO3 on BMP is 6; repeat BMP ordered for hemolyzed sample which confirmed the above numbers    2 of 50meq HCO3 pushes given stat   Bicarb drip resumed at 150cc/Hr  Nephro contacted for urgent dialysis. Per Nephro will start patient on CVVH and give one time dose of stat 1 g vancomycin and increase cefepime to 2 g q12h.   Consulting ID: Follow up with ID Resident called for Bradycardia on monitor.   HR checked manually was in high 70s.   ECG stat ordered showing prolonged [place] interval (1st degree AV block).   STAT BMP sent.   Calcium gluconate, insulin, dextrose given as 1 time dosing each.   Lasix 40mg IV once given  Lactulose Q2H ordered until BM.    Follow up:  ABGs done Showing pH 6.8, K of 6.8, lactic acid of 20   HCO3 on BMP is 6; repeat BMP ordered for hemolyzed sample which confirmed the above numbers    2 of 50meq HCO3 pushes given stat   Bicarb drip resumed at 150cc/Hr  Nephro contacted for urgent dialysis. Per Nephro will start patient on CVVH and give one time dose of stat 1 g vancomycin and increase cefepime to 2 g q12h.   Consulting ID: Follow up with ID Resident called for Bradycardia on monitor.   HR checked manually was in high 70s.   ECG stat ordered showing prolonged [place] interval (1st degree AV block).   STAT BMP sent.   Calcium gluconate, insulin, dextrose given as 1 time dosing each.   Lasix 40mg IV once given  Lactulose Q2H ordered until BM.  Blood cultures were sent on 12/30 AM     Follow up:  ABGs done Showing pH 6.8, K of 6.8, lactic acid of 20   HCO3 on BMP is 6; repeat BMP ordered for hemolyzed sample which confirmed the above numbers    2 of 50meq HCO3 pushes given stat   Bicarb drip resumed at 150cc/Hr  Nephro contacted for urgent dialysis. Per Nephro will start patient on CVVH and give one time dose of stat 1 g vancomycin and increase cefepime to 2 g q12h.   Consulting ID: Follow up with ID  Added Vasopressor as Levophed requirements were increasing Resident called for Bradycardia on monitor.   HR checked manually was in high 70s.   ECG stat ordered showing prolonged [place] interval (1st degree AV block).   STAT BMP sent.   Calcium gluconate, insulin, dextrose given as 1 time dosing each.   Lasix 40mg IV once given  Lactulose Q2H ordered until BM.  Blood cultures were sent on 12/30 AM     Follow up:  ABGs done Showing pH 6.8, K of 6.8, lactic acid of 20   HCO3 on BMP is 6; repeat BMP ordered for hemolyzed sample which confirmed the above numbers    2 of 50meq HCO3 pushes given stat   Bicarb drip resumed at 150cc/Hr  Nephro contacted for urgent dialysis. Per Nephro will start patient on CVVH and give one time dose of stat 1 g vancomycin and increase cefepime to 2 g q12h.   Consulting ID: Follow up with ID  Added Vasopressor as Levophed requirements were increasing

## 2023-12-30 NOTE — CHART NOTE - NSCHARTNOTEFT_GEN_A_CORE
Pt with worsening hyperkalemia of 6.8 and pH 6.8  Rising lactic acid and pressors requirements  Despite having HD today    Most likely due to sepsis.  MRSA PCR+    Will start CVVHD    cc/hr  No UF    Increase Bicarb drip to 150 cc/hr  Add Vanco 1 gram 12 while on CVVHD  Increase Cefepime to 2 gr q12 during CVVHD.  Pt is informed by the primary ream for the worsening   condition and need to continue with CVVHD  because of worsening hyperkalemia, acidosis, hypotension.  Pt is very poor.

## 2023-12-30 NOTE — PROCEDURAL SAFETY CHECKLIST WITH OR WITHOUT SEDATION - NSCNFIRMBLDLOSS_GEN_ALL_CORE
-- DO NOT REPLY / DO NOT REPLY ALL --  -- Message is from [facility]agement Center Operations ([facility]) --    General Patient Message     Patient is calling in to check the status of the medication he requested,he has not heard anything. Please call back and assist.     Caller Information       Type Contact Phone/Fax    12/07/2022 12:46 PM CST Phone (Incoming) Ever Tong TRACY (Self) [phone] (H)        Alternative phone number: none    Can a detailed message be left? Yes    Message Turnaround: [place]:    Refer to site's KB page for routing instructions    Please give this turnaround time to the caller:   \"You can expect to receive a response 2-3 business days after your provider's clinical team reviews the message\"              
n/a
n/a
done

## 2023-12-30 NOTE — PROGRESS NOTE ADULT - SUBJECTIVE AND OBJECTIVE BOX
Nephrology progress note    Patient is seen and examined, events over the last 24 h noted .  Worsening hypotension, hyperkalemia and met acidosis overnight  Started on CVVHD    Allergies:  No Known Allergies    Hospital Medications:   MEDICATIONS  (STANDING):  aspirin  chewable 81 milliGRAM(s) Oral daily  atorvastatin 20 milliGRAM(s) Oral at bedtime  azithromycin  IVPB 500 milliGRAM(s) IV Intermittent every 24 hours  cefepime   IVPB 2000 milliGRAM(s) IV Intermittent every 12 hours  chlorhexidine 0.12% Liquid 15 milliLiter(s) Oral Mucosa every 12 hours  chlorhexidine 2% Cloths 1 Application(s) Topical <User Schedule>  CRRT Treatment    <Continuous>  dextrose 5%. 1000 milliLiter(s) (50 mL/Hr) IV Continuous <Continuous>  dextrose 5%. 1000 milliLiter(s) (100 mL/Hr) IV Continuous <Continuous>  dextrose 50% Injectable 25 Gram(s) IV Push once  dextrose 50% Injectable 12.5 Gram(s) IV Push once  dextrose 50% Injectable 25 Gram(s) IV Push once  enoxaparin Injectable 40 milliGRAM(s) SubCutaneous every 24 hours  glucagon  Injectable 1 milliGRAM(s) IntraMuscular once  insulin glargine Injectable (LANTUS) 26 Unit(s) SubCutaneous at bedtime  insulin lispro (ADMELOG) corrective regimen sliding scale   SubCutaneous three times a day before meals  lactulose Syrup 30 Gram(s) Oral every 2 hours  mupirocin 2% Ointment 1 Application(s) Both Nostrils two times a day  norepinephrine Infusion 0.05 MICROgram(s)/kG/Min (3.76 mL/Hr) IV Continuous <Continuous>  propofol Infusion 20 MICROgram(s)/kG/Min (10.2 mL/Hr) IV Continuous <Continuous>  PureFlow Dialysate RFP-400 (K 2 / Ca 3) 5000 milliLiter(s) (2000 mL/Hr) CRRT <Continuous>  QUEtiapine 25 milliGRAM(s) Oral daily  sodium bicarbonate  Infusion 0.281 mEq/kG/Hr (150 mL/Hr) IV Continuous <Continuous>  sodium zirconium cyclosilicate 10 Gram(s) Oral two times a day  vancomycin  IVPB 1000 milliGRAM(s) IV Intermittent every 12 hours  vasopressin Infusion 0.04 Unit(s)/Min (6 mL/Hr) IV Continuous <Continuous>        VITALS:  T(F): 98.2 (12-30-23 @ 04:00), Max: 98.2 (12-30-23 @ 04:00)  HR: 83 (12-30-23 @ 08:30)  BP: 115/56 (12-30-23 @ 08:00)  RR: 24 (12-30-23 @ 08:30)  SpO2: 96% (12-30-23 @ 08:30)  Wt(kg): --    12-29 @ 07:01  -  12-30 @ 07:00  --------------------------------------------------------  IN: 2090.6 mL / OUT: 210 mL / NET: 1880.6 mL    12-30 @ 07:01  -  12-30 @ 09:17  --------------------------------------------------------  IN: 308.4 mL / OUT: 146 mL / NET: 162.4 mL          PHYSICAL EXAM:  Constitutional: NAD  On vent  Respiratory: BS b/l+  Cardiovascular: S1, S2, RRR  Gastrointestinal: BS+, soft,   Extremities: mild peripheral edema  Neurological: Unresponsive  : coco stanley.   Vascular Access: [place]    LABS:  12-30    139  |  91<L>  |  41<H>  ----------------------------<  187<H>  5.9<H>   |  7<LL>  |  3.3<H>    Ca    7.9<L>      30 Dec 2023 04:30  Mg     2.2     12-30    TPro  4.8<L>  /  Alb  2.6<L>  /  TBili  0.9  /  DBili      /  AST  4814<H>  /  ALT  2517<H>  /  AlkPhos  157<H>  12-30                          11.2   26.34 )-----------( 326      ( 30 Dec 2023 04:30 )             38.1       Urine Studies:  Urinalysis Basic - ( 30 Dec 2023 04:30 )    Color:  / Appearance:  / SG:  / pH:   Gluc: 187 mg/dL / Ketone:   / Bili:  / Urobili:    Blood:  / Protein:  / Nitrite:    Leuk Esterase:  / RBC:  / WBC    Sq Epi:  / Non Sq Epi:  / Bacteria:         RADIOLOGY & ADDITIONAL STUDIES:  < from: Xray Chest 1 View- PORTABLE-Routine (Xray Chest 1 View- PORTABLE-Routine in AM.) (12.30.23 @ 06:35) >    Impression:    Bilateral opacities, decreased.    < end of copied text >   Nephrology progress note    Patient is seen and examined, events over the last 24 h noted .  Worsening hypotension, hyperkalemia and met acidosis overnight  Started on CVVHD    Allergies:  No Known Allergies    Hospital Medications:   MEDICATIONS  (STANDING):  aspirin  chewable 81 milliGRAM(s) Oral daily  atorvastatin 20 milliGRAM(s) Oral at bedtime  azithromycin  IVPB 500 milliGRAM(s) IV Intermittent every 24 hours  cefepime   IVPB 2000 milliGRAM(s) IV Intermittent every 12 hours  chlorhexidine 0.12% Liquid 15 milliLiter(s) Oral Mucosa every 12 hours  chlorhexidine 2% Cloths 1 Application(s) Topical <User Schedule>  CRRT Treatment    <Continuous>  dextrose 5%. 1000 milliLiter(s) (50 mL/Hr) IV Continuous <Continuous>  dextrose 5%. 1000 milliLiter(s) (100 mL/Hr) IV Continuous <Continuous>  dextrose 50% Injectable 25 Gram(s) IV Push once  dextrose 50% Injectable 12.5 Gram(s) IV Push once  dextrose 50% Injectable 25 Gram(s) IV Push once  enoxaparin Injectable 40 milliGRAM(s) SubCutaneous every 24 hours  glucagon  Injectable 1 milliGRAM(s) IntraMuscular once  insulin glargine Injectable (LANTUS) 26 Unit(s) SubCutaneous at bedtime  insulin lispro (ADMELOG) corrective regimen sliding scale   SubCutaneous three times a day before meals  lactulose Syrup 30 Gram(s) Oral every 2 hours  mupirocin 2% Ointment 1 Application(s) Both Nostrils two times a day  norepinephrine Infusion 0.05 MICROgram(s)/kG/Min (3.76 mL/Hr) IV Continuous <Continuous>  propofol Infusion 20 MICROgram(s)/kG/Min (10.2 mL/Hr) IV Continuous <Continuous>  PureFlow Dialysate RFP-400 (K 2 / Ca 3) 5000 milliLiter(s) (2000 mL/Hr) CRRT <Continuous>  QUEtiapine 25 milliGRAM(s) Oral daily  sodium bicarbonate  Infusion 0.281 mEq/kG/Hr (150 mL/Hr) IV Continuous <Continuous>  sodium zirconium cyclosilicate 10 Gram(s) Oral two times a day  vancomycin  IVPB 1000 milliGRAM(s) IV Intermittent every 12 hours  vasopressin Infusion 0.04 Unit(s)/Min (6 mL/Hr) IV Continuous <Continuous>        VITALS:  T(F): 98.2 (12-30-23 @ 04:00), Max: 98.2 (12-30-23 @ 04:00)  HR: 83 (12-30-23 @ 08:30)  BP: 115/56 (12-30-23 @ 08:00)  RR: 24 (12-30-23 @ 08:30)  SpO2: 96% (12-30-23 @ 08:30)  Wt(kg): --    12-29 @ 07:01  -  12-30 @ 07:00  --------------------------------------------------------  IN: 2090.6 mL / OUT: 210 mL / NET: 1880.6 mL    12-30 @ 07:01  -  12-30 @ 09:17  --------------------------------------------------------  IN: 308.4 mL / OUT: 146 mL / NET: 162.4 mL          PHYSICAL EXAM:  Constitutional: NAD  On vent  Respiratory: BS b/l+  Cardiovascular: S1, S2, RRR  Gastrointestinal: BS+, soft,   Extremities: mild peripheral edema  Neurological: Unresponsive  : coco stanley.   Vascular Access: [place]    LABS:  12-30    139  |  91<L>  |  41<H>  ----------------------------<  187<H>  5.9<H>   |  7<LL>  |  3.3<H>    Ca    7.9<L>      30 Dec 2023 04:30  Mg     2.2     12-30    TPro  4.8<L>  /  Alb  2.6<L>  /  TBili  0.9  /  DBili      /  AST  4814<H>  /  ALT  2517<H>  /  AlkPhos  157<H>  12-30                          11.2   26.34 )-----------( 326      ( 30 Dec 2023 04:30 )             38.1       Urine Studies:  Urinalysis Basic - ( 30 Dec 2023 04:30 )    Color:  / Appearance:  / SG:  / pH:   Gluc: 187 mg/dL / Ketone:   / Bili:  / Urobili:    Blood:  / Protein:  / Nitrite:    Leuk Esterase:  / RBC:  / WBC    Sq Epi:  / Non Sq Epi:  / Bacteria:         RADIOLOGY & ADDITIONAL STUDIES:  < from: Xray Chest 1 View- PORTABLE-Routine (Xray Chest 1 View- PORTABLE-Routine in AM.) (12.30.23 @ 06:35) >    Impression:    Bilateral opacities, decreased.    < end of copied text >

## 2024-01-04 LAB
CULTURE RESULTS: SIGNIFICANT CHANGE UP
CULTURE RESULTS: SIGNIFICANT CHANGE UP
SPECIMEN SOURCE: SIGNIFICANT CHANGE UP
SPECIMEN SOURCE: SIGNIFICANT CHANGE UP
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
## 2024-01-05 DIAGNOSIS — Z96.653 PRESENCE OF ARTIFICIAL KNEE JOINT, BILATERAL: ICD-10-CM

## 2024-01-05 DIAGNOSIS — I46.8 CARDIAC ARREST DUE TO OTHER UNDERLYING CONDITION: ICD-10-CM

## 2024-01-05 DIAGNOSIS — Z66 DO NOT RESUSCITATE: ICD-10-CM

## 2024-01-05 DIAGNOSIS — A41.9 SEPSIS, UNSPECIFIED ORGANISM: ICD-10-CM

## 2024-01-05 DIAGNOSIS — R65.21 SEVERE SEPSIS WITH SEPTIC SHOCK: ICD-10-CM
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
## 2024-01-05 DIAGNOSIS — M10.9 GOUT, UNSPECIFIED: ICD-10-CM

## 2024-01-05 DIAGNOSIS — K72.00 ACUTE AND SUBACUTE HEPATIC FAILURE WITHOUT COMA: ICD-10-CM

## 2024-01-05 DIAGNOSIS — E87.4 MIXED DISORDER OF ACID-BASE BALANCE: ICD-10-CM

## 2024-01-05 DIAGNOSIS — N17.0 ACUTE KIDNEY FAILURE WITH TUBULAR NECROSIS: ICD-10-CM
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
## 2024-01-05 DIAGNOSIS — J69.0 PNEUMONITIS DUE TO INHALATION OF FOOD AND VOMIT: ICD-10-CM

## 2024-01-05 DIAGNOSIS — K55.9 VASCULAR DISORDER OF INTESTINE, UNSPECIFIED: ICD-10-CM

## 2024-01-05 DIAGNOSIS — I12.9 HYPERTENSIVE CHRONIC KIDNEY DISEASE WITH STAGE 1 THROUGH STAGE 4 CHRONIC KIDNEY DISEASE, OR UNSPECIFIED CHRONIC KIDNEY DISEASE: ICD-10-CM

## 2024-01-05 DIAGNOSIS — G93.1 ANOXIC BRAIN DAMAGE, NOT ELSEWHERE CLASSIFIED: ICD-10-CM

## 2024-01-05 DIAGNOSIS — J96.01 ACUTE RESPIRATORY FAILURE WITH HYPOXIA: ICD-10-CM

## 2024-01-05 DIAGNOSIS — E87.5 HYPERKALEMIA: ICD-10-CM

## 2024-01-05 DIAGNOSIS — E78.5 HYPERLIPIDEMIA, UNSPECIFIED: ICD-10-CM

## 2024-01-05 DIAGNOSIS — N18.32 CHRONIC KIDNEY DISEASE, STAGE 3B: ICD-10-CM

## 2024-01-05 DIAGNOSIS — E11.22 TYPE 2 DIABETES MELLITUS WITH DIABETIC CHRONIC KIDNEY DISEASE: ICD-10-CM

## 2024-01-05 DIAGNOSIS — T17.928A FOOD IN RESPIRATORY TRACT, PART UNSPECIFIED CAUSING OTHER INJURY, INITIAL ENCOUNTER: ICD-10-CM
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
## 2024-02-15 ENCOUNTER — APPOINTMENT (OUTPATIENT)
Dept: ORTHOPEDIC SURGERY | Facility: CLINIC | Age: 84
End: 2024-02-15

## 2024-06-18 NOTE — ED PROVIDER NOTE - NSFOLLOWUPINSTRUCTIONS_ED_ALL_ED_FT
Medication:   Semaglutide, 1 MG/DOSE, 4 MG/3ML Solution Pen-injector 9 mL 0 2/8/2024 --    Sig - Route: Inject 1 mg into the skin every 7 days. Indications: Type 2 Diabetes - Subcutaneous    passed protocol.     Last office visit date: 3/4/2024  Next appointment scheduled?: Yes, 7/9/2024  Number of refills given: 0    To confirm, will patient remain on 1 mg dosing versus continuing to titrate to higher doses?     -Follow up with your primary care provider in 1 - 3 days  -Use albuterol inhaler for your wheezing every 4 hours as needed  -Take Azithromycin as directed for 5 days  -Return to ED for worsening symptoms or concerns.      Cough    Coughing is a reflex that clears your throat and your airways. Coughing helps to heal and protect your lungs. It is normal to cough occasionally, but a cough that happens with other symptoms or lasts a long time may be a sign of a condition that needs treatment. Coughing may be caused by infections, asthma or COPD, smoking, postnasal drip, gastroesophageal reflux, as well as other medical conditions. Take medicines only as instructed by your health care provider. Avoid environments or triggers that causes you to cough at work or at home.    SEEK IMMEDIATE MEDICAL CARE IF YOU HAVE ANY OF THE FOLLOWING SYMPTOMS: coughing up blood, shortness of breath, rapid heart rate, chest pain, unexplained weight loss or night sweats.

## 2024-06-28 NOTE — DISCHARGE NOTE FOR THE EXPIRED PATIENT - REASON FOR ADMISSION
Patient called with complaints of vaginal yeast infection and rash to her bilateral wrists and ankles. Rash is pruritic. She has applied topical cortisone cream to the rash without significant relief.     Will e scribe diflucan for yeast    For the rash, recommend po OTC Allegra or Zyrtec. Topical Benadryl and cortisone for itching. Avoid po Benadryl due to sedation potential.     Patient v/u.    S/P Cardiac Arrest